# Patient Record
Sex: MALE | Race: WHITE | NOT HISPANIC OR LATINO | URBAN - METROPOLITAN AREA
[De-identification: names, ages, dates, MRNs, and addresses within clinical notes are randomized per-mention and may not be internally consistent; named-entity substitution may affect disease eponyms.]

---

## 2018-02-28 ENCOUNTER — INPATIENT (INPATIENT)
Facility: HOSPITAL | Age: 53
LOS: 2 days | Discharge: HOME | End: 2018-03-03
Attending: HOSPITALIST | Admitting: HOSPITALIST
Payer: COMMERCIAL

## 2018-02-28 VITALS — HEIGHT: 78 IN | WEIGHT: 235.01 LBS

## 2018-02-28 DIAGNOSIS — M96.621 FRACTURE OF HUMERUS FOLLOWING INSERTION OF ORTHOPEDIC IMPLANT, JOINT PROSTHESIS, OR BONE PLATE, RIGHT ARM: Chronic | ICD-10-CM

## 2018-02-28 LAB
ALBUMIN SERPL ELPH-MCNC: 3.8 G/DL — SIGNIFICANT CHANGE UP (ref 3.3–5)
ALBUMIN SERPL ELPH-MCNC: 3.9 G/DL — SIGNIFICANT CHANGE UP (ref 3.3–5)
ALP SERPL-CCNC: 74 U/L — SIGNIFICANT CHANGE UP (ref 40–120)
ALP SERPL-CCNC: 74 U/L — SIGNIFICANT CHANGE UP (ref 40–120)
ALT FLD-CCNC: 60 U/L — SIGNIFICANT CHANGE UP (ref 12–78)
ALT FLD-CCNC: 60 U/L — SIGNIFICANT CHANGE UP (ref 12–78)
ANION GAP SERPL CALC-SCNC: 11 MMOL/L — SIGNIFICANT CHANGE UP (ref 5–17)
ANION GAP SERPL CALC-SCNC: 9 MMOL/L — SIGNIFICANT CHANGE UP (ref 5–17)
APTT BLD: 28.3 SEC — SIGNIFICANT CHANGE UP (ref 27.5–37.4)
AST SERPL-CCNC: 34 U/L — SIGNIFICANT CHANGE UP (ref 15–37)
AST SERPL-CCNC: 38 U/L — HIGH (ref 15–37)
BASOPHILS # BLD AUTO: 0.1 K/UL — SIGNIFICANT CHANGE UP (ref 0–0.2)
BASOPHILS NFR BLD AUTO: 2.3 % — HIGH (ref 0–2)
BILIRUB DIRECT SERPL-MCNC: 0.1 MG/DL — SIGNIFICANT CHANGE UP (ref 0–0.2)
BILIRUB INDIRECT FLD-MCNC: 0.5 MG/DL — SIGNIFICANT CHANGE UP (ref 0.2–1)
BILIRUB SERPL-MCNC: 0.3 MG/DL — SIGNIFICANT CHANGE UP (ref 0.2–1.2)
BILIRUB SERPL-MCNC: 0.6 MG/DL — SIGNIFICANT CHANGE UP (ref 0.2–1.2)
BLD GP AB SCN SERPL QL: SIGNIFICANT CHANGE UP
BUN SERPL-MCNC: 12 MG/DL — SIGNIFICANT CHANGE UP (ref 7–23)
BUN SERPL-MCNC: 14 MG/DL — SIGNIFICANT CHANGE UP (ref 7–23)
CALCIUM SERPL-MCNC: 8.9 MG/DL — SIGNIFICANT CHANGE UP (ref 8.5–10.1)
CALCIUM SERPL-MCNC: 9 MG/DL — SIGNIFICANT CHANGE UP (ref 8.5–10.1)
CHLORIDE SERPL-SCNC: 100 MMOL/L — SIGNIFICANT CHANGE UP (ref 96–108)
CHLORIDE SERPL-SCNC: 109 MMOL/L — HIGH (ref 96–108)
CK SERPL-CCNC: 97 U/L — SIGNIFICANT CHANGE UP (ref 26–308)
CO2 SERPL-SCNC: 23 MMOL/L — SIGNIFICANT CHANGE UP (ref 22–31)
CO2 SERPL-SCNC: 27 MMOL/L — SIGNIFICANT CHANGE UP (ref 22–31)
CREAT SERPL-MCNC: 0.76 MG/DL — SIGNIFICANT CHANGE UP (ref 0.5–1.3)
CREAT SERPL-MCNC: 0.87 MG/DL — SIGNIFICANT CHANGE UP (ref 0.5–1.3)
EOSINOPHIL # BLD AUTO: 0.3 K/UL — SIGNIFICANT CHANGE UP (ref 0–0.5)
EOSINOPHIL NFR BLD AUTO: 6.3 % — HIGH (ref 0–6)
GLUCOSE SERPL-MCNC: 104 MG/DL — HIGH (ref 70–99)
GLUCOSE SERPL-MCNC: 97 MG/DL — SIGNIFICANT CHANGE UP (ref 70–99)
HCT VFR BLD CALC: 43.4 % — SIGNIFICANT CHANGE UP (ref 39–50)
HGB BLD-MCNC: 14.5 G/DL — SIGNIFICANT CHANGE UP (ref 13–17)
INR BLD: 0.96 RATIO — SIGNIFICANT CHANGE UP (ref 0.88–1.16)
LYMPHOCYTES # BLD AUTO: 1.1 K/UL — SIGNIFICANT CHANGE UP (ref 1–3.3)
LYMPHOCYTES # BLD AUTO: 25.2 % — SIGNIFICANT CHANGE UP (ref 13–44)
MAGNESIUM SERPL-MCNC: 2 MG/DL — SIGNIFICANT CHANGE UP (ref 1.6–2.6)
MCHC RBC-ENTMCNC: 29.6 PG — SIGNIFICANT CHANGE UP (ref 27–34)
MCHC RBC-ENTMCNC: 33.4 GM/DL — SIGNIFICANT CHANGE UP (ref 32–36)
MCV RBC AUTO: 88.6 FL — SIGNIFICANT CHANGE UP (ref 80–100)
MONOCYTES # BLD AUTO: 0.4 K/UL — SIGNIFICANT CHANGE UP (ref 0–0.9)
MONOCYTES NFR BLD AUTO: 9.9 % — SIGNIFICANT CHANGE UP (ref 2–14)
NEUTROPHILS # BLD AUTO: 2.4 K/UL — SIGNIFICANT CHANGE UP (ref 1.8–7.4)
NEUTROPHILS NFR BLD AUTO: 56.2 % — SIGNIFICANT CHANGE UP (ref 43–77)
PHOSPHATE SERPL-MCNC: 3.4 MG/DL — SIGNIFICANT CHANGE UP (ref 2.5–4.5)
PLATELET # BLD AUTO: 286 K/UL — SIGNIFICANT CHANGE UP (ref 150–400)
POTASSIUM SERPL-MCNC: 3.8 MMOL/L — SIGNIFICANT CHANGE UP (ref 3.5–5.3)
POTASSIUM SERPL-MCNC: 3.9 MMOL/L — SIGNIFICANT CHANGE UP (ref 3.5–5.3)
POTASSIUM SERPL-SCNC: 3.8 MMOL/L — SIGNIFICANT CHANGE UP (ref 3.5–5.3)
POTASSIUM SERPL-SCNC: 3.9 MMOL/L — SIGNIFICANT CHANGE UP (ref 3.5–5.3)
PROT SERPL-MCNC: 7.5 GM/DL — SIGNIFICANT CHANGE UP (ref 6–8.3)
PROT SERPL-MCNC: 7.6 GM/DL — SIGNIFICANT CHANGE UP (ref 6–8.3)
PROTHROM AB SERPL-ACNC: 10.4 SEC — SIGNIFICANT CHANGE UP (ref 9.8–12.7)
RBC # BLD: 4.9 M/UL — SIGNIFICANT CHANGE UP (ref 4.2–5.8)
RBC # FLD: 12.8 % — SIGNIFICANT CHANGE UP (ref 10.3–14.5)
SODIUM SERPL-SCNC: 136 MMOL/L — SIGNIFICANT CHANGE UP (ref 135–145)
SODIUM SERPL-SCNC: 143 MMOL/L — SIGNIFICANT CHANGE UP (ref 135–145)
TROPONIN I SERPL-MCNC: <0.015 NG/ML — SIGNIFICANT CHANGE UP (ref 0.01–0.04)
TROPONIN I SERPL-MCNC: <0.015 NG/ML — SIGNIFICANT CHANGE UP (ref 0.01–0.04)
TYPE + AB SCN PNL BLD: SIGNIFICANT CHANGE UP
WBC # BLD: 4.2 K/UL — SIGNIFICANT CHANGE UP (ref 3.8–10.5)
WBC # FLD AUTO: 4.2 K/UL — SIGNIFICANT CHANGE UP (ref 3.8–10.5)

## 2018-02-28 PROCEDURE — 71275 CT ANGIOGRAPHY CHEST: CPT | Mod: 26

## 2018-02-28 PROCEDURE — 74175 CTA ABDOMEN W/CONTRAST: CPT | Mod: 26

## 2018-02-28 PROCEDURE — 99285 EMERGENCY DEPT VISIT HI MDM: CPT

## 2018-02-28 PROCEDURE — 71045 X-RAY EXAM CHEST 1 VIEW: CPT | Mod: 26

## 2018-02-28 PROCEDURE — 93010 ELECTROCARDIOGRAM REPORT: CPT

## 2018-02-28 RX ORDER — ACETAMINOPHEN 500 MG
650 TABLET ORAL EVERY 6 HOURS
Qty: 0 | Refills: 0 | Status: DISCONTINUED | OUTPATIENT
Start: 2018-02-28 | End: 2018-03-03

## 2018-02-28 RX ORDER — ASPIRIN/CALCIUM CARB/MAGNESIUM 324 MG
81 TABLET ORAL DAILY
Qty: 0 | Refills: 0 | Status: DISCONTINUED | OUTPATIENT
Start: 2018-02-28 | End: 2018-03-03

## 2018-02-28 RX ORDER — FOLIC ACID 0.8 MG
1 TABLET ORAL DAILY
Qty: 0 | Refills: 0 | Status: DISCONTINUED | OUTPATIENT
Start: 2018-02-28 | End: 2018-03-03

## 2018-02-28 RX ORDER — LABETALOL HCL 100 MG
10 TABLET ORAL ONCE
Qty: 0 | Refills: 0 | Status: COMPLETED | OUTPATIENT
Start: 2018-02-28 | End: 2018-02-28

## 2018-02-28 RX ORDER — THIAMINE MONONITRATE (VIT B1) 100 MG
100 TABLET ORAL DAILY
Qty: 0 | Refills: 0 | Status: COMPLETED | OUTPATIENT
Start: 2018-02-28 | End: 2018-03-03

## 2018-02-28 RX ORDER — INFLUENZA VIRUS VACCINE 15; 15; 15; 15 UG/.5ML; UG/.5ML; UG/.5ML; UG/.5ML
0.5 SUSPENSION INTRAMUSCULAR ONCE
Qty: 0 | Refills: 0 | Status: COMPLETED | OUTPATIENT
Start: 2018-02-28 | End: 2018-02-28

## 2018-02-28 RX ORDER — ENOXAPARIN SODIUM 100 MG/ML
40 INJECTION SUBCUTANEOUS DAILY
Qty: 0 | Refills: 0 | Status: DISCONTINUED | OUTPATIENT
Start: 2018-02-28 | End: 2018-02-28

## 2018-02-28 RX ORDER — METOPROLOL TARTRATE 50 MG
50 TABLET ORAL ONCE
Qty: 0 | Refills: 0 | Status: COMPLETED | OUTPATIENT
Start: 2018-02-28 | End: 2018-02-28

## 2018-02-28 RX ORDER — ASPIRIN/CALCIUM CARB/MAGNESIUM 324 MG
325 TABLET ORAL ONCE
Qty: 0 | Refills: 0 | Status: COMPLETED | OUTPATIENT
Start: 2018-02-28 | End: 2018-02-28

## 2018-02-28 RX ORDER — ATORVASTATIN CALCIUM 80 MG/1
40 TABLET, FILM COATED ORAL AT BEDTIME
Qty: 0 | Refills: 0 | Status: DISCONTINUED | OUTPATIENT
Start: 2018-02-28 | End: 2018-03-03

## 2018-02-28 RX ORDER — NITROGLYCERIN 6.5 MG
30 CAPSULE, EXTENDED RELEASE ORAL
Qty: 50 | Refills: 0 | Status: DISCONTINUED | OUTPATIENT
Start: 2018-02-28 | End: 2018-03-03

## 2018-02-28 RX ORDER — HYDRALAZINE HCL 50 MG
10 TABLET ORAL ONCE
Qty: 0 | Refills: 0 | Status: COMPLETED | OUTPATIENT
Start: 2018-02-28 | End: 2018-02-28

## 2018-02-28 RX ADMIN — Medication 650 MILLIGRAM(S): at 16:55

## 2018-02-28 RX ADMIN — Medication 1 MILLIGRAM(S): at 16:46

## 2018-02-28 RX ADMIN — Medication 325 MILLIGRAM(S): at 11:11

## 2018-02-28 RX ADMIN — Medication 1 TABLET(S): at 16:46

## 2018-02-28 RX ADMIN — Medication 100 MILLIGRAM(S): at 16:46

## 2018-02-28 RX ADMIN — ATORVASTATIN CALCIUM 40 MILLIGRAM(S): 80 TABLET, FILM COATED ORAL at 21:24

## 2018-02-28 RX ADMIN — Medication 10 MILLIGRAM(S): at 11:11

## 2018-02-28 RX ADMIN — Medication 10 MILLIGRAM(S): at 14:36

## 2018-02-28 RX ADMIN — Medication 9 MICROGRAM(S)/MIN: at 16:12

## 2018-02-28 RX ADMIN — Medication 50 MILLIGRAM(S): at 12:47

## 2018-02-28 RX ADMIN — Medication 650 MILLIGRAM(S): at 16:46

## 2018-02-28 RX ADMIN — Medication 10 MILLIGRAM(S): at 11:49

## 2018-02-28 NOTE — ED PROVIDER NOTE - PROGRESS NOTE DETAILS
CT scan negative for dissection; (+) aorta aneurysm; hypertensive urgency, emergency- labetalol given; will admit.

## 2018-02-28 NOTE — H&P ADULT - NSHPLABSRESULTS_GEN_ALL_CORE
14.5   4.2   )-----------( 286      ( 28 Feb 2018 09:34 )             43.4   02-28    143  |  109<H>  |  14  ----------------------------<  104<H>  3.9   |  23  |  0.87    Ca    8.9      28 Feb 2018 09:34    TPro  7.5  /  Alb  3.8  /  TBili  0.3  /  DBili  x   /  AST  38<H>  /  ALT  60  /  AlkPhos  74  02-28    PT/INR - ( 28 Feb 2018 09:34 )   PT: 10.4 sec;   INR: 0.96 ratio         PTT - ( 28 Feb 2018 09:34 )  PTT:28.3 sec    EXAM:  CT ANGIO ABDOMEN ONLY IC                          EXAM:  CTA CHEST DISSECTION                            PROCEDURE DATE:  02/28/2018          INTERPRETATION:  Clinical information: 51-year-old man with chest pain   radiating to the left arm    COMPARISON: None    PROCEDURE:   CT of the Chest, Abdomen and Pelvis was performed with and without   intravenous contrast.   Precontrast imaging was performed through the chest followed by arterial   phase imaging of the chest, abdomen and pelvis in the arterial phase.  Intravenous contrast: 90 ml Omnipaque 350. 10 ml discarded.  Oral contrast:None.  Sagittal and coronal reformats were performed and MIP images were   obtained.    FINDINGS:    CHEST:     LOWER NECK: Calcification in the thyroid isthmus.  AXILLA, MEDIASTINUM AND KYLIE: No lymphadenopathy.  VESSELS: Mildly dilated ascending aorta measuring 4.4 cm at the level of   the main pulmonary artery. No dissection, intramural hematoma or   penetrating ulcer.  HEART: Heart size is normal.No pericardial effusion.  PLEURA: No pleural effusion.  LUNGS AND LARGE AIRWAYS: Patent central airways. No nodule, mass or   consolidation.  CHEST WALL:  Unremarkable    ABDOMEN AND PELVIS:    LIVER: Within normal limits.  SPLEEN: Within normal limits.  PANCREAS: Within normal limits.  GALLBLADDER: Within normal limits.  BILE DUCTS: Normal caliber.  ADRENALS: Within normal limits.  KIDNEYS/URETERS: No mass, stone or hydronephrosis.    RETROPERITONEUM: No lymphadenopathy.    VESSELS:  Within normal limits.      BOWEL: No bowel obstruction, wall thickening or inflammatory change.   Appendix normal. Colonic diverticulosis without diverticulitis.  PERITONEUM: No ascites or pneumoperitoneum.    REPRODUCTIVE ORGANS: Prostate seminal vesicles are unremarkable.  BLADDER: Within normal limits    ABDOMINAL WALL: Within normal limits.  BONES: No acute abnormality.    IMPRESSION: Aneurysmal ascending aorta measuring 4.4 cm. No dissection,   penetrating ulcer or intramural hemorrhage. No acute chest or abdominal   pathology.                  MAGALI WILDER   This document has been electronically signed. Feb 28 2018 10:48AM 14.5   4.2   )-----------( 286      ( 28 Feb 2018 09:34 )             43.4   02-28    143  |  109<H>  |  14  ----------------------------<  104<H>  3.9   |  23  |  0.87    Ca    8.9      28 Feb 2018 09:34    TPro  7.5  /  Alb  3.8  /  TBili  0.3  /  DBili  x   /  AST  38<H>  /  ALT  60  /  AlkPhos  74  02-28    PT/INR - ( 28 Feb 2018 09:34 )   PT: 10.4 sec;   INR: 0.96 ratio         PTT - ( 28 Feb 2018 09:34 )  PTT:28.3 sec    EXAM:  CT ANGIO ABDOMEN ONLY IC                          EXAM:  CTA CHEST DISSECTION                            PROCEDURE DATE:  02/28/2018          INTERPRETATION:  Clinical information: 51-year-old man with chest pain   radiating to the left arm    COMPARISON: None    PROCEDURE:   CT of the Chest, Abdomen and Pelvis was performed with and without   intravenous contrast.   Precontrast imaging was performed through the chest followed by arterial   phase imaging of the chest, abdomen and pelvis in the arterial phase.  Intravenous contrast: 90 ml Omnipaque 350. 10 ml discarded.  Oral contrast:None.  Sagittal and coronal reformats were performed and MIP images were   obtained.    FINDINGS:    CHEST:     LOWER NECK: Calcification in the thyroid isthmus.  AXILLA, MEDIASTINUM AND KYLIE: No lymphadenopathy.  VESSELS: Mildly dilated ascending aorta measuring 4.4 cm at the level of   the main pulmonary artery. No dissection, intramural hematoma or   penetrating ulcer.  HEART: Heart size is normal.No pericardial effusion.  PLEURA: No pleural effusion.  LUNGS AND LARGE AIRWAYS: Patent central airways. No nodule, mass or   consolidation.  CHEST WALL:  Unremarkable    ABDOMEN AND PELVIS:    LIVER: Within normal limits.  SPLEEN: Within normal limits.  PANCREAS: Within normal limits.  GALLBLADDER: Within normal limits.  BILE DUCTS: Normal caliber.  ADRENALS: Within normal limits.  KIDNEYS/URETERS: No mass, stone or hydronephrosis.    RETROPERITONEUM: No lymphadenopathy.    VESSELS:  Within normal limits.      BOWEL: No bowel obstruction, wall thickening or inflammatory change.   Appendix normal. Colonic diverticulosis without diverticulitis.  PERITONEUM: No ascites or pneumoperitoneum.    REPRODUCTIVE ORGANS: Prostate seminal vesicles are unremarkable.  BLADDER: Within normal limits    ABDOMINAL WALL: Within normal limits.  BONES: No acute abnormality.    IMPRESSION: Aneurysmal ascending aorta measuring 4.4 cm. No dissection,   penetrating ulcer or intramural hemorrhage. No acute chest or abdominal   pathology.              EKG: GORDON WILDER   This document has been electronically signed. Feb 28 2018 10:48AM

## 2018-02-28 NOTE — ED ADULT NURSE REASSESSMENT NOTE - NS ED NURSE REASSESS COMMENT FT1
pt c/o headache and blurred vision. Dr flores called and made aware, as per MD lower tridal infusion from 30 to 10, pt medicated with tlyneol. safey maintained will continue to monitor Pt bp from 220/155 to 144/101 Dr flores aware

## 2018-02-28 NOTE — ED PROVIDER NOTE - OBJECTIVE STATEMENT
53 y/o male with no relevant PMHx presents to the ED c/o substernal CP radiating to the left arm today. 5 days ago pt thought he pulled a muscle in his back and yesterday he "didn't feel right". This morning he woke up with chest tightness that radiated to his left arm with entire left arm numbness. Pt reports SOB. Currently, pt reports chest tightness, left arm numbness. No weakness. No daily medications. Last PMD visit was 5 years ago. Pt notes that he has a family cardiac history, but is unsure of what specifically. Never-smoker. Pt drinks 3-4 beers every day. No illicit substance use.

## 2018-02-28 NOTE — ED ADULT NURSE NOTE - OBJECTIVE STATEMENT
Pt presents to ED from home, ambulatory, pt alert and orientedx4, HTN noted, pt c/o chest tightness, sob, back pain, left arm numbness since yesteday. pt denies hx of MI or CVA. safety maintained will continue to monitor

## 2018-02-28 NOTE — H&P ADULT - ATTENDING COMMENTS
Pt. seen and examined with NETTE Roche. Agree with assessment and plan as above. Changes made where appropriate.

## 2018-02-28 NOTE — SBIRT NOTE. - NSSBIRTSERVICES_GEN_A_ED_FT
Provided SBIRT services: Full screen positive. Brief Intervention Performed. Screening results were reviewed with the patient and patient was provided information about healthy guidelines and potential negative consequences associated with level of risk. Motivation and readiness to reduce or stop use was discussed and goals and activities to make changes were suggested/offered.  Audit Score: 13  DAST Score: 0

## 2018-02-28 NOTE — H&P ADULT - HISTORY OF PRESENT ILLNESS
51 y/o male with no past medical history who presents to the ED with chest pain radiating down left arm and feels the pain shooting through his back. Pt. states he has had the pain since yesterday afternoon and he thought it would subside but this morning his left arm was numb and he drove himself to the ED. He has not seen a doctor in over 5 years and takes no medications. As per pt, he has a blood pressure cuff at home and when he took his BP over the years it would be in the 180s. 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 Of note, he drinks 4-5 beers every day for past 25 years.    ED course: s/p IV Labetalol 10mg x 2, IV Hydralazine BP persists 190s/130s.     CT angio: IMPRESSION: Aneurysmal ascending aorta measuring 4.4 cm. No dissection, penetrating ulcer or intramural hemorrhage. No acute chest or abdominal pathology.    Xray: Impression: The heart is normal in size. The lungs are clear. Multiple rib fractures   are seen on the left. Age indeterminant. No pneumothorax. 51 y/o male with no past medical history who presents to the ED with chest pain radiating down left arm and feels the pain shooting through his back. Pt. states he has had the pain 1 day PTA and he thought it would subside but morning of arrival to ED his left arm was numb and he drove himself to the ED. He has not seen a doctor in over 5 years and takes no medications. As per pt, he has a blood pressure cuff at home and when he took his BP over the years it would be in the 180s. He is a lenz and states he walks about 34361 steps a day and has good exercise tolerance but has noticed over the past year that he gets winded and tired quicker. Of note, he drinks 4-5 beers every day for past 25 years. Last drink 2/27.   No hx of w/d seizures.     ED course: s/p IV Labetalol 10mg x 2, IV Hydralazine BP persists 190s/130s.     CT angio: IMPRESSION: Aneurysmal ascending aorta measuring 4.4 cm. No dissection, penetrating ulcer or intramural hemorrhage. No acute chest or abdominal pathology.    Xray: Impression: The heart is normal in size. The lungs are clear. Multiple rib fractures   are seen on the left. Age indeterminant. No pneumothorax.      social: non smoker, drinks 4-5 beers daily for 25 yrs, no ellicit drug use  Shx: none  Fhx: mother HTN, father-> unknown, siblings alive and healthy, significant cardiac history on maternal aunts/uncles ie early CAD

## 2018-02-28 NOTE — H&P ADULT - NSHPPHYSICALEXAM_GEN_ALL_CORE
PHYSICAL EXAM:    GEN: lying in bed, anxious  HEENT:   NC/AT  CV:  +S1, +S2, RRR  RESP:  CTA B/L  GI:  abdomen soft, non-tender, non-distended, normoactive BS  MSK:   normal muscle tone  EXT:  no edema  NEURO:  AAOX3, no focal neurological deficits  SKIN:  no rashes PHYSICAL EXAM:    GEN: lying in bed, anxious  HEENT:   NC/AT, no carotid bruits  CV:  +S1, +S2, RRR  RESP:  CTA B/L  GI:  abdomen soft, non-tender, non-distended, normoactive BS  MSK:   normal muscle tone  EXT:  no edema  NEURO:  AAOX3, no focal neurological deficits  SKIN:  no rashes

## 2018-02-28 NOTE — H&P ADULT - ASSESSMENT
51 y/o M with no PMHx admitted with:    # chest pain/HTN emergency/rule out ACS  - BP uncontrolled despite IV Labetalol and IV Hydralazine  - admit to CICU   - start nitro gtt  - cardio consult - Dr. Hendricks  - f/u 2D echo    # triple A  - IMPRESSION: Aneurysmal ascending aorta measuring 4.4 cm. No dissection,   penetrating ulcer or intramural hemorrhage. No acute chest or abdominal   pathology.  - cardio thoracic consult  - no previous radiology to compare to    # ETOH use  - [facility] protocol   - MVI, thiamine, folic acid  - discussed etoh withdrawal symptoms, need for cessation    # rib fx  - CXR: Multiple rib fractures are seen on the left  - pain control  - PT when BP stable    # DVT ppx  - Lovenox SQ 53 y/o M with no PMHx admitted with:    # HTN emergency/Angina  - BP uncontrolled despite IV Labetalol and IV Hydralazine  - admit to CICU   - start nitro gtt. ASA, statin. When BP controlled start BB/ACE-I  - cardio consult - Dr. Hendricks  - f/u 2D echo    # AAA  - IMPRESSION: Aneurysmal ascending aorta measuring 4.4 cm. No dissection,   penetrating ulcer or intramural hemorrhage. No acute chest or abdominal   pathology.  - cardio thoracic consult  - no previous radiology to compare to    # ETOH use  - [facility] protocol   - MVI, thiamine, folic acid  - discussed etoh withdrawal symptoms, need for cessation    # rib fx  - CXR: Multiple rib fractures are seen on the left-age indeterminate fractures  - pain control  - PT when BP stable    # DVT ppx  - with ongoing chest pain and AAA, will hold LMWH/UFH and control BP better. SCDs for now.

## 2018-02-28 NOTE — H&P ADULT - FAMILY HISTORY
Mother  Still living? Unknown  Family history of heart disease, Age at diagnosis: Age Unknown     Uncle  Still living? Yes, Estimated age: Age Unknown  Family history of heart disease, Age at diagnosis: Age Unknown

## 2018-03-01 DIAGNOSIS — I16.0 HYPERTENSIVE URGENCY: ICD-10-CM

## 2018-03-01 DIAGNOSIS — R07.2 PRECORDIAL PAIN: ICD-10-CM

## 2018-03-01 LAB
ANION GAP SERPL CALC-SCNC: 7 MMOL/L — SIGNIFICANT CHANGE UP (ref 5–17)
BUN SERPL-MCNC: 12 MG/DL — SIGNIFICANT CHANGE UP (ref 7–23)
CALCIUM SERPL-MCNC: 8.7 MG/DL — SIGNIFICANT CHANGE UP (ref 8.5–10.1)
CHLORIDE SERPL-SCNC: 105 MMOL/L — SIGNIFICANT CHANGE UP (ref 96–108)
CHOLEST SERPL-MCNC: 225 MG/DL — HIGH (ref 10–199)
CO2 SERPL-SCNC: 27 MMOL/L — SIGNIFICANT CHANGE UP (ref 22–31)
CREAT SERPL-MCNC: 0.8 MG/DL — SIGNIFICANT CHANGE UP (ref 0.5–1.3)
GLUCOSE SERPL-MCNC: 103 MG/DL — HIGH (ref 70–99)
HBA1C BLD-MCNC: 5.6 % — SIGNIFICANT CHANGE UP (ref 4–5.6)
HDLC SERPL-MCNC: 83 MG/DL — SIGNIFICANT CHANGE UP (ref 40–125)
LIPID PNL WITH DIRECT LDL SERPL: 125 MG/DL — SIGNIFICANT CHANGE UP
MAGNESIUM SERPL-MCNC: 2.5 MG/DL — SIGNIFICANT CHANGE UP (ref 1.6–2.6)
PHOSPHATE SERPL-MCNC: 3.3 MG/DL — SIGNIFICANT CHANGE UP (ref 2.5–4.5)
POTASSIUM SERPL-MCNC: 4.1 MMOL/L — SIGNIFICANT CHANGE UP (ref 3.5–5.3)
POTASSIUM SERPL-SCNC: 4.1 MMOL/L — SIGNIFICANT CHANGE UP (ref 3.5–5.3)
SODIUM SERPL-SCNC: 139 MMOL/L — SIGNIFICANT CHANGE UP (ref 135–145)
TOTAL CHOLESTEROL/HDL RATIO MEASUREMENT: 2.7 RATIO — LOW (ref 3.4–9.6)
TRIGL SERPL-MCNC: 86 MG/DL — SIGNIFICANT CHANGE UP (ref 10–149)

## 2018-03-01 PROCEDURE — 99223 1ST HOSP IP/OBS HIGH 75: CPT

## 2018-03-01 PROCEDURE — 93306 TTE W/DOPPLER COMPLETE: CPT | Mod: 26

## 2018-03-01 PROCEDURE — 93010 ELECTROCARDIOGRAM REPORT: CPT

## 2018-03-01 RX ORDER — ALPRAZOLAM 0.25 MG
0.25 TABLET ORAL EVERY 8 HOURS
Qty: 0 | Refills: 0 | Status: DISCONTINUED | OUTPATIENT
Start: 2018-03-01 | End: 2018-03-03

## 2018-03-01 RX ORDER — HYDRALAZINE HCL 50 MG
10 TABLET ORAL EVERY 8 HOURS
Qty: 0 | Refills: 0 | Status: DISCONTINUED | OUTPATIENT
Start: 2018-03-01 | End: 2018-03-03

## 2018-03-01 RX ORDER — HYDRALAZINE HCL 50 MG
10 TABLET ORAL ONCE
Qty: 0 | Refills: 0 | Status: COMPLETED | OUTPATIENT
Start: 2018-03-01 | End: 2018-03-01

## 2018-03-01 RX ORDER — METOPROLOL TARTRATE 50 MG
5 TABLET ORAL EVERY 6 HOURS
Qty: 0 | Refills: 0 | Status: DISCONTINUED | OUTPATIENT
Start: 2018-03-01 | End: 2018-03-02

## 2018-03-01 RX ORDER — HYDRALAZINE HCL 50 MG
25 TABLET ORAL EVERY 8 HOURS
Qty: 0 | Refills: 0 | Status: DISCONTINUED | OUTPATIENT
Start: 2018-03-01 | End: 2018-03-03

## 2018-03-01 RX ORDER — METOPROLOL TARTRATE 50 MG
5 TABLET ORAL ONCE
Qty: 0 | Refills: 0 | Status: COMPLETED | OUTPATIENT
Start: 2018-03-01 | End: 2018-03-01

## 2018-03-01 RX ORDER — HYDRALAZINE HCL 50 MG
25 TABLET ORAL THREE TIMES A DAY
Qty: 0 | Refills: 0 | Status: DISCONTINUED | OUTPATIENT
Start: 2018-03-01 | End: 2018-03-01

## 2018-03-01 RX ADMIN — Medication 25 MILLIGRAM(S): at 21:57

## 2018-03-01 RX ADMIN — Medication 25 MILLIGRAM(S): at 14:10

## 2018-03-01 RX ADMIN — Medication 0.25 MILLIGRAM(S): at 21:57

## 2018-03-01 RX ADMIN — Medication 5 MILLIGRAM(S): at 17:11

## 2018-03-01 RX ADMIN — Medication 81 MILLIGRAM(S): at 11:36

## 2018-03-01 RX ADMIN — Medication 5 MILLIGRAM(S): at 10:21

## 2018-03-01 RX ADMIN — Medication 1 MILLIGRAM(S): at 11:36

## 2018-03-01 RX ADMIN — Medication 100 MILLIGRAM(S): at 11:36

## 2018-03-01 RX ADMIN — Medication 2 MILLIGRAM(S): at 11:05

## 2018-03-01 RX ADMIN — Medication 10 MILLIGRAM(S): at 10:43

## 2018-03-01 RX ADMIN — Medication 1 TABLET(S): at 11:36

## 2018-03-01 RX ADMIN — ATORVASTATIN CALCIUM 40 MILLIGRAM(S): 80 TABLET, FILM COATED ORAL at 21:57

## 2018-03-01 NOTE — PROGRESS NOTE ADULT - SUBJECTIVE AND OBJECTIVE BOX
CHIEF COMPLAINT: chest pain    SUBJECTIVE: no EtOH wd however somewhat anxious.  BP meds changed this AM per cardio with good result thus far.  Back pain better still reports left arm pain.    REVIEW OF SYSTEMS: All other review of systems is negative unless indicated above    Vital Signs Last 24 Hrs  T(C): 36.2 (01 Mar 2018 16:16), Max: 36.2 (01 Mar 2018 16:16)  T(F): 97.2 (01 Mar 2018 16:16), Max: 97.2 (01 Mar 2018 16:16)  HR: 99 (01 Mar 2018 14:30) (68 - 107)  BP: 140/74 (01 Mar 2018 14:30) (109/56 - 210/137)  BP(mean): 91 (01 Mar 2018 14:30) (70 - 151)  RR: 13 (28 Feb 2018 19:00) (13 - 16)  SpO2: --    PHYSICAL EXAM:  	GEN: lying in bed, anxious  	HEENT:   NC/AT, no carotid bruits  	CV:  +S1, +S2, RRR  	RESP:  CTA B/L  	GI:  abdomen soft, non-tender, non-distended, normoactive BS  	MSK:   normal muscle tone  	EXT:  no edema  	NEURO:  AAOX3, no focal neurological deficits    SKIN:  no rashes    MEDICATIONS:  MEDICATIONS  (STANDING):  ALPRAZolam 0.25 milliGRAM(s) Oral every 8 hours  aspirin  chewable 81 milliGRAM(s) Oral daily  atorvastatin 40 milliGRAM(s) Oral at bedtime  folic acid 1 milliGRAM(s) Oral daily  hydrALAZINE 25 milliGRAM(s) Oral every 8 hours  metoprolol    tartrate Injectable 5 milliGRAM(s) IV Push every 6 hours  multivitamin 1 Tablet(s) Oral daily  nitroglycerin  Infusion 30 MICROgram(s)/Min (9 mL/Hr) IV Continuous <Continuous>  thiamine 100 milliGRAM(s) Oral daily    LABS: All Labs Reviewed:                        14.5   4.2   )-----------( 286      ( 28 Feb 2018 09:34 )             43.4     139  |  105  |  12  ----------------------------<  103<H>  4.1   |  27  |  0.80    Ca    8.7      01 Mar 2018 06:40  Phos  3.3     03-01  Mg     2.5     03-01    TPro  7.6  /  Alb  3.9  /  TBili  0.6  /  DBili  0.1  /  AST  34  /  ALT  60  /  AlkPhos  74  02-28    PT/INR - ( 28 Feb 2018 09:34 )   PT: 10.4 sec;   INR: 0.96 ratio    PTT - ( 28 Feb 2018 09:34 )  PTT:28.3 sec  CARDIAC MARKERS ( 28 Feb 2018 15:45 )  <0.015 ng/mL / x     / 97 U/L / x     / x      CARDIAC MARKERS ( 28 Feb 2018 09:34 )  <0.015 ng/mL / x     / x     / x     / x
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
## 2018-03-02 PROCEDURE — 99233 SBSQ HOSP IP/OBS HIGH 50: CPT

## 2018-03-02 RX ORDER — METOPROLOL TARTRATE 50 MG
25 TABLET ORAL
Qty: 0 | Refills: 0 | Status: DISCONTINUED | OUTPATIENT
Start: 2018-03-02 | End: 2018-03-03

## 2018-03-02 RX ORDER — METOPROLOL TARTRATE 50 MG
25 TABLET ORAL DAILY
Qty: 0 | Refills: 0 | Status: DISCONTINUED | OUTPATIENT
Start: 2018-03-02 | End: 2018-03-02

## 2018-03-02 RX ADMIN — Medication 25 MILLIGRAM(S): at 12:17

## 2018-03-02 RX ADMIN — Medication 1 TABLET(S): at 12:17

## 2018-03-02 RX ADMIN — Medication 100 MILLIGRAM(S): at 12:17

## 2018-03-02 RX ADMIN — Medication 25 MILLIGRAM(S): at 06:54

## 2018-03-02 RX ADMIN — Medication 0.25 MILLIGRAM(S): at 14:22

## 2018-03-02 RX ADMIN — Medication 81 MILLIGRAM(S): at 12:17

## 2018-03-02 RX ADMIN — Medication 25 MILLIGRAM(S): at 22:14

## 2018-03-02 RX ADMIN — Medication 0.25 MILLIGRAM(S): at 06:53

## 2018-03-02 RX ADMIN — Medication 10 MILLIGRAM(S): at 17:06

## 2018-03-02 RX ADMIN — Medication 650 MILLIGRAM(S): at 22:19

## 2018-03-02 RX ADMIN — ATORVASTATIN CALCIUM 40 MILLIGRAM(S): 80 TABLET, FILM COATED ORAL at 22:16

## 2018-03-02 RX ADMIN — Medication 5 MILLIGRAM(S): at 06:53

## 2018-03-02 RX ADMIN — Medication 1 MILLIGRAM(S): at 12:17

## 2018-03-02 RX ADMIN — Medication 25 MILLIGRAM(S): at 14:22

## 2018-03-02 RX ADMIN — Medication 5 MILLIGRAM(S): at 00:37

## 2018-03-02 RX ADMIN — Medication 0.25 MILLIGRAM(S): at 22:14
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
## 2018-03-02 NOTE — PROGRESS NOTE ADULT - SUBJECTIVE AND OBJECTIVE BOX
CHIEF COMPLAINT: chest pain    SUBJECTIVE: no EtOH wd.  Left arm pain improves when bp better. BP has been variably high and low.      REVIEW OF SYSTEMS: All other review of systems is negative unless indicated above    Vital Signs Last 24 Hrs  T(C): 36.6 (02 Mar 2018 05:47), Max: 36.6 (01 Mar 2018 19:32)  T(F): 97.9 (02 Mar 2018 05:47), Max: 97.9 (01 Mar 2018 19:32)  HR: 83 (02 Mar 2018 14:00) (70 - 100)  BP: 162/108 (02 Mar 2018 14:00) (106/75 - 208/124)  BP(mean): 119 (02 Mar 2018 14:00) (82 - 149)  RR: --  SpO2: --    PHYSICAL EXAM:  	GEN: lying in bed, anxious  	HEENT:   NC/AT, no carotid bruits  	CV:  +S1, +S2, RRR  	RESP:  CTA B/L  	GI:  abdomen soft, non-tender, non-distended, normoactive BS  	MSK:   normal muscle tone  	EXT:  no edema  	NEURO:  AAOX3, no focal neurological deficits              SKIN:  no rashes    MEDICATIONS:  MEDICATIONS  (STANDING):  ALPRAZolam 0.25 milliGRAM(s) Oral every 8 hours  aspirin  chewable 81 milliGRAM(s) Oral daily  atorvastatin 40 milliGRAM(s) Oral at bedtime  folic acid 1 milliGRAM(s) Oral daily  hydrALAZINE 25 milliGRAM(s) Oral every 8 hours  metoprolol succinate ER 25 milliGRAM(s) Oral daily  multivitamin 1 Tablet(s) Oral daily  nitroglycerin  Infusion 30 MICROgram(s)/Min (9 mL/Hr) IV Continuous <Continuous>  thiamine 100 milliGRAM(s) Oral daily    LABS: All Labs Reviewed:    139  |  105  |  12  ----------------------------<  103<H>  4.1   |  27  |  0.80    Ca    8.7      01 Mar 2018 06:40  Phos  3.3     03-01  Mg     2.5     03-01    < from: Transthoracic Echocardiogram (03.01.18 @ 11:28) >   Mild septal left ventricular hypertrophy is present. Left ventricular   wall   motion is normal. Estimated left ventricular ejection fraction is 60 %.   The aortic valve leaflets are well seen with normal valve morphology;   preserved leaflet excursion noted. No aortic valve insufficiency noted.   There is thickening of both mitral valve leaflets. The leaflet opening is   normal. No mitral regurgitation is present.

## 2018-03-02 NOTE — PROGRESS NOTE ADULT - SUBJECTIVE AND OBJECTIVE BOX
Subjective:  51 y/o male with no past medical history (does not see primary care but takes BP at home and gets readings of SBP of 180)  presented to the ED 2/28 with chest pain radiating down left arm and feels the pain shooting through his back. Pt. states he has had the pain 1 day PTA and he thought it would subside but morning of arrival to ED his left arm was numb and he drove himself to the ED. Pt w malignant HTN, SBP up to 200s. Pt had dissection protocol CT which showed an Aneurysmal ascending aorta measuring 4.4 cm. No dissection, penetrating ulcer or intramural hemorrhage. No acute chest or abdominal   pathology. Pt states he continues to have pain intermittently, but it is much better.  Pt BP better controlled on po/IV antihypertensives. OVernight with some elevated readings of SBP to 200s.    Vital Signs:  Vital Signs Last 24 Hrs  T(C): 36.6 (03-02-18 @ 05:47), Max: 36.6 (03-01-18 @ 19:32)  T(F): 97.9 (03-02-18 @ 05:47), Max: 97.9 (03-01-18 @ 19:32)  HR: 92 (03-02-18 @ 09:00) (70 - 107)  BP: 106/75 (03-02-18 @ 08:30) (106/75 - 210/137)  RR: --  SpO2: -- on (O2)    Telemetry/Alarms:    Relevant labs, radiology and Medications reviewed                        14.5   4.2   )-----------( 286      ( 28 Feb 2018 09:34 )             43.4     03-01    139  |  105  |  12  ----------------------------<  103<H>  4.1   |  27  |  0.80    Ca    8.7      01 Mar 2018 06:40  Phos  3.3     03-01  Mg     2.5     03-01    TPro  7.6  /  Alb  3.9  /  TBili  0.6  /  DBili  0.1  /  AST  34  /  ALT  60  /  AlkPhos  74  02-28    PT/INR - ( 28 Feb 2018 09:34 )   PT: 10.4 sec;   INR: 0.96 ratio         PTT - ( 28 Feb 2018 09:34 )  PTT:28.3 sec  MEDICATIONS  (STANDING):  ALPRAZolam 0.25 milliGRAM(s) Oral every 8 hours  aspirin  chewable 81 milliGRAM(s) Oral daily  atorvastatin 40 milliGRAM(s) Oral at bedtime  folic acid 1 milliGRAM(s) Oral daily  hydrALAZINE 25 milliGRAM(s) Oral every 8 hours  metoprolol succinate ER 25 milliGRAM(s) Oral daily  multivitamin 1 Tablet(s) Oral daily  nitroglycerin  Infusion 30 MICROgram(s)/Min (9 mL/Hr) IV Continuous <Continuous>  thiamine 100 milliGRAM(s) Oral daily    MEDICATIONS  (PRN):  acetaminophen   Tablet. 650 milliGRAM(s) Oral every 6 hours PRN Mild Pain (1 - 3)  hydrALAZINE Injectable 10 milliGRAM(s) IV Push every 8 hours PRN if sbp >190      Physical exam  Gen NAD  NeuroAAOx3  Card RRR  Pulm clear  Abd soft NT  Ext warm      I&O's Summary    01 Mar 2018 07:01  -  02 Mar 2018 07:00  --------------------------------------------------------  IN: 0 mL / OUT: 1 mL / NET: -1 mL        Assessment  52y Male  4.4 ascending aorta aneurysm    PLAN  f/u in 6 months w CT chest, Dr. Olson  strict BP control as per medicine/cardiology    Discussed with Cardiothoracic Team at AM rounds.

## 2018-03-02 NOTE — CDI QUERY NOTE - NSCDIOTHERTXTBX_GEN_ALL_CORE_HH
Documentation on chart of ETOH abuse. Pt. on Thiamine 100 mgs po daily.  Please document a clinical diagnosis with the above.  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

## 2018-03-02 NOTE — PROGRESS NOTE ADULT - PROBLEM SELECTOR PLAN 1
Resolved. Shoulder pain may represent elevation of blood pressure. Ischemia workup discussed and can follow up as opt.

## 2018-03-02 NOTE — PROGRESS NOTE ADULT - ASSESSMENT
51 y/o male with no past medical history who presents to the ED with chest pain radiating down left arm and feels the pain shooting through his back. Pt. states he has had the pain 1 day PTA and he thought it would subside but morning of arrival to ED his left arm was numb and he drove himself to the ED. He has not seen a doctor in over 5 years and takes no medications. As per pt, he has a blood pressure cuff at home and when he took his BP over the years it would be in the 180s. 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 Of note, he drinks 4-5 beers every day for past 25 years. Last drink 2/27.   No hx of w/d seizures.     ED course: s/p IV Labetalol 10mg x 2, IV Hydralazine BP persists 190s/130s.     CT angio: IMPRESSION: Aneurysmal ascending aorta measuring 4.4 cm. No dissection, penetrating ulcer or intramural hemorrhage. No acute chest or abdominal pathology.    Xray: Impression: The heart is normal in size. The lungs are clear. Multiple rib fractures   are seen on the left. Age indeterminant. No pneumothorax.    # HTN emergency/Angina  - meds being adjusted   - admit to CICU   - cardio consult - Dr. Hendricks appreciated  - f/u 2D echo    # AAA  - IMPRESSION: Aneurysmal ascending aorta measuring 4.4 cm. No dissection,   penetrating ulcer or intramural hemorrhage. No acute chest or abdominal   pathology.  - cardio thoracic consult appreicated. rpt study 6 months  - no previous radiology to compare to    # Left arm pain  - Cspine imaging? Unlikely able to be MRI d/t hardware from prior surgeries.  - Will re-eval if pain not improving     # ETOH use  # Anxiety  - dc ciwa  - MVI, thiamine, folic acid  - discussed etoh withdrawal symptoms, need for cessation  - low dose xanax q8h    # rib fx  - CXR: Multiple rib fractures are seen on the left-age indeterminate fractures  - pain control  - PT when BP stable    # DVT ppx  - with ongoing chest pain and AAA, will hold LMWH/UFH and control BP better. SCDs for now.
A/P- 53 y/o male with no known past medical history (does not see primary care but takes BP at home and gets readings of SBP of 180) admitted 2/28 w with chest pain radiating down left arm and feels the pain shooting through his back, w left arm was numb  found to have an Aneurysmal ascending aorta measuring 4.4 cm. No dissection, penetrating ulcer or intramural hemorrhage.   pathology.    No acute surgical intervention  f/u CT chest in 6 months  f/u Dr. Olson, [phone]
53 y/o male with no past medical history who presents to the ED with chest pain radiating down left arm and feels the pain shooting through his back. Pt. states he has had the pain 1 day PTA and he thought it would subside but morning of arrival to ED his left arm was numb and he drove himself to the ED. He has not seen a doctor in over 5 years and takes no medications. As per pt, he has a blood pressure cuff at home and when he took his BP over the years it would be in the 180s. 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 Of note, he drinks 4-5 beers every day for past 25 years. Last drink 2/27.   No hx of w/d seizures.     ED course: s/p IV Labetalol 10mg x 2, IV Hydralazine BP persists 190s/130s.     CT angio: IMPRESSION: Aneurysmal ascending aorta measuring 4.4 cm. No dissection, penetrating ulcer or intramural hemorrhage. No acute chest or abdominal pathology.    Xray: Impression: The heart is normal in size. The lungs are clear. Multiple rib fractures   are seen on the left. Age indeterminant. No pneumothorax.    # HTN emergency/Angina  - meds being adjusted- po hydral and po toprol succ BID dosing.  IV hydral prn.   - admit to CICU   - cardio consult - Dr. Hendricks appreciated  - Echo EF 60%, no wma.    # AAA  - IMPRESSION: Aneurysmal ascending aorta measuring 4.4 cm. No dissection,   penetrating ulcer or intramural hemorrhage. No acute chest or abdominal   pathology.  - cardio thoracic consult appreicated. rpt study 6 months  - no previous radiology to compare to    # Left arm pain possibly angina equivalent? possibly related to cervical spine disease however seems less likely given association with spikes in BP.   - Outpt ischemic eval per cardio    # ETOH use  # Anxiety  - dc ciwa  - MVI, thiamine, folic acid (thiamine for possible thiamine deficiency in setting of chronic EtOH)  - discussed etoh withdrawal symptoms, need for cessation  - low dose xanax q8h    # rib fx  - CXR: Multiple rib fractures are seen on the left-age indeterminate fractures  - pain control  - PT when BP stable    # DVT ppx  - with ongoing chest pain and AAA, will hold LMWH/UFH and control BP better. SCDs for now.

## 2018-03-03 ENCOUNTER — TRANSCRIPTION ENCOUNTER (OUTPATIENT)
Age: 53
End: 2018-03-03

## 2018-03-03 VITALS — RESPIRATION RATE: 25 BRPM | SYSTOLIC BLOOD PRESSURE: 168 MMHG | HEART RATE: 80 BPM | DIASTOLIC BLOOD PRESSURE: 111 MMHG

## 2018-03-03 PROBLEM — Z00.00 ENCOUNTER FOR PREVENTIVE HEALTH EXAMINATION: Status: ACTIVE | Noted: 2018-03-03

## 2018-03-03 RX ORDER — HYDRALAZINE HCL 50 MG
1 TABLET ORAL
Qty: 90 | Refills: 0
Start: 2018-03-03 | End: 2018-04-01

## 2018-03-03 RX ORDER — ASPIRIN/CALCIUM CARB/MAGNESIUM 324 MG
1 TABLET ORAL
Qty: 30 | Refills: 0 | OUTPATIENT
Start: 2018-03-03 | End: 2018-04-01

## 2018-03-03 RX ORDER — METOPROLOL TARTRATE 50 MG
1 TABLET ORAL
Qty: 60 | Refills: 0
Start: 2018-03-03 | End: 2018-04-01

## 2018-03-03 RX ORDER — ATORVASTATIN CALCIUM 80 MG/1
1 TABLET, FILM COATED ORAL
Qty: 30 | Refills: 0
Start: 2018-03-03 | End: 2018-04-01

## 2018-03-03 RX ADMIN — Medication 100 MILLIGRAM(S): at 11:14

## 2018-03-03 RX ADMIN — Medication 1 TABLET(S): at 11:15

## 2018-03-03 RX ADMIN — Medication 650 MILLIGRAM(S): at 06:54

## 2018-03-03 RX ADMIN — Medication 1 MILLIGRAM(S): at 11:15

## 2018-03-03 RX ADMIN — Medication 81 MILLIGRAM(S): at 11:15

## 2018-03-03 RX ADMIN — Medication 0.25 MILLIGRAM(S): at 13:01

## 2018-03-03 RX ADMIN — Medication 25 MILLIGRAM(S): at 06:55

## 2018-03-03 RX ADMIN — Medication 25 MILLIGRAM(S): at 13:01

## 2018-03-03 RX ADMIN — Medication 0.25 MILLIGRAM(S): at 06:55

## 2018-03-03 NOTE — DISCHARGE NOTE ADULT - MEDICATION SUMMARY - MEDICATIONS TO TAKE
I will START or STAY ON the medications listed below when I get home from the hospital:    aspirin 81 mg oral tablet, chewable  -- 1 tab(s) by mouth once a day  -- Indication: For Heart attack prevention    atorvastatin 40 mg oral tablet  -- 1 tab(s) by mouth once a day (at bedtime)  -- Indication: For Heart attack prevention    metoprolol succinate 50 mg oral tablet, extended release  -- 1 tab(s) by mouth 2 times a day   -- Indication: For blood pressure    hydrALAZINE 25 mg oral tablet  -- 1 tab(s) by mouth every 8 hours  -- Indication: For blood pressure

## 2018-03-03 NOTE — DISCHARGE NOTE ADULT - PLAN OF CARE
blood pressure control take hydralazine 3 times daily- every 8 hours  take metoprolol 2 times daily in the morning and before bed  follow up with Dr. Hendricks for further medication adjustments in 1-2 weeks Follow up with Dr. Hendricks in 1-2 weeks for stress testing - It is very important to maintain blood pressure control to prevent worsening of the aneurysm.   - Follow up with Dr. Olson in 6 months to repeat CT scan of your chest to make sure the aneuysm is not getting larger.

## 2018-03-03 NOTE — DISCHARGE NOTE ADULT - CARE PROVIDER_API CALL
primary care doctor,   Phone: (   )    -  Fax: (   )    -    Earl Hendricks (MD), Cardiovascular Disease  [address] [place] [address]  Phone: [phone]  Fax: [phone]    Jorge Alberto Olson), Surgery; Thoracic Surgery  [address] [place] [address]  Phone: [phone]  Fax: [phone]

## 2018-03-03 NOTE — DISCHARGE NOTE ADULT - HOSPITAL COURSE
CHIEF COMPLAINT: chest pain    SUBJECTIVE: No EtOH withdrawl.  No further chest or arm pain.  BP still high but better and without spike and drops in BP.    REVIEW OF SYSTEMS: All other review of systems is negative unless indicated above    Vital Signs Last 24 Hrs  T(C): 36.1 (02 Mar 2018 23:25), Max: 36.1 (02 Mar 2018 23:25)  T(F): 96.9 (02 Mar 2018 23:25), Max: 96.9 (02 Mar 2018 23:25)  HR: 79 (03 Mar 2018 10:00) (62 - 95)  BP: 169/113 (03 Mar 2018 10:00) (128/89 - 208/124)  BP(mean): 125 (03 Mar 2018 10:00) (98 - 142)  RR: 18 (03 Mar 2018 08:00) (14 - 18)  SpO2: 98% (03 Mar 2018 07:45) (98% - 98%)    PHYSICAL EXAM:  	GEN: lying in bed, anxious  	HEENT:   NC/AT, no carotid bruits  	CV:  +S1, +S2, RRR  	RESP:  CTA B/L  	GI:  abdomen soft, non-tender, non-distended, normoactive BS  	MSK:   normal muscle tone  	EXT:  no edema  	NEURO:  AAOX3, no focal neurological deficits              SKIN:  no rashes    course/assessment and plan  53 y/o male with no past medical history who presents to the ED with chest pain radiating down left arm and feels the pain shooting through his back. Pt states he has had the pain 1 day PTA and he thought it would subside but morning of arrival to ED his left arm was numb and he drove himself to the ED. He has not seen a doctor in over 5 years and takes no medications. As per pt, he has a blood pressure cuff at home and when he took his BP over the years it would be in the 180s. 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 Of note, he drinks 4-5 beers every day for past 25 years. Last drink 2/27.     # HTN emergency/Angina  - Toprol XL 50mg BID  - Hydralazine 25mg TID  - Aspirin 81 daily  - Lipitor 40 HS  - cardio consult - Dr. Hendricks appreciated- outpatient f/u for BP med management and stress testing  - Echo EF 60%, no wma.    # AAA  - IMPRESSION: Aneurysmal ascending aorta measuring 4.4 cm. No dissection,   penetrating ulcer or intramural hemorrhage. No acute chest or abdominal   pathology.  - cardio thoracic consult appreicated. rpt study 6 months  - no previous radiology to compare to    # Left arm pain possibly angina equivalent? possibly related to cervical spine disease however seems less likely given association with spikes in BP.   - Outpt ischemic eval per cardio    # ETOH use  # Anxiety  - dc ciwa  - MVI, thiamine, folic acid (thiamine for possible thiamine deficiency in setting of chronic EtOH)  - discussed etoh withdrawal symptoms, need for cessation    # rib fx  - CXR: Multiple rib fractures are seen on the left-age indeterminate fractures  - pain control    Time spent on discharge 45 minutes

## 2018-03-03 NOTE — DISCHARGE NOTE ADULT - PATIENT PORTAL LINK FT
You can access the [facility] Patient Portal, offered by [facility], by registering with the following website: [email or web address]

## 2018-03-03 NOTE — DISCHARGE NOTE ADULT - PROVIDER TOKENS
FREE:[LAST:[primary care doctor],PHONE:[(   )    -],FAX:[(   )    -]],TOKEN:'[id number]:'[id number]'

## 2018-03-03 NOTE — DISCHARGE NOTE ADULT - CARE PLAN
Principal Discharge DX:	Hypertensive urgency, malignant  Goal:	blood pressure control  Assessment and plan of treatment:	take hydralazine 3 times daily- every 8 hours  take metoprolol 2 times daily in the morning and before bed  follow up with Dr. Hendricks for further medication adjustments in 1-2 weeks  Secondary Diagnosis:	Chest pain  Assessment and plan of treatment:	Follow up with Dr. Hendricks in 1-2 weeks for stress testing  Secondary Diagnosis:	Aortic aneurysm  Assessment and plan of treatment:	- It is very important to maintain blood pressure control to prevent worsening of the aneurysm.   - Follow up with Dr. Olson in 6 months to repeat CT scan of your chest to make sure the aneuysm is not getting larger.

## 2018-03-07 DIAGNOSIS — Z72.89 OTHER PROBLEMS RELATED TO LIFESTYLE: ICD-10-CM

## 2018-03-07 DIAGNOSIS — I71.4 ABDOMINAL AORTIC ANEURYSM, WITHOUT RUPTURE: ICD-10-CM

## 2018-03-07 DIAGNOSIS — F41.9 ANXIETY DISORDER, UNSPECIFIED: ICD-10-CM

## 2018-03-07 DIAGNOSIS — M50.30 OTHER CERVICAL DISC DEGENERATION, UNSPECIFIED CERVICAL REGION: ICD-10-CM

## 2018-03-07 DIAGNOSIS — I16.1 HYPERTENSIVE EMERGENCY: ICD-10-CM

## 2018-03-07 DIAGNOSIS — E51.8 OTHER MANIFESTATIONS OF THIAMINE DEFICIENCY: ICD-10-CM
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
## 2018-03-07 DIAGNOSIS — S22.42XA MULTIPLE FRACTURES OF RIBS, LEFT SIDE, INITIAL ENCOUNTER FOR CLOSED FRACTURE: ICD-10-CM
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
## 2018-03-07 DIAGNOSIS — I20.9 ANGINA PECTORIS, UNSPECIFIED: ICD-10-CM

## 2018-03-16 ENCOUNTER — APPOINTMENT (OUTPATIENT)
Dept: CARDIOLOGY | Facility: CLINIC | Age: 53
End: 2018-03-16
Payer: COMMERCIAL

## 2018-03-16 ENCOUNTER — NON-APPOINTMENT (OUTPATIENT)
Age: 53
End: 2018-03-16

## 2018-03-16 VITALS — DIASTOLIC BLOOD PRESSURE: 96 MMHG | HEART RATE: 102 BPM | OXYGEN SATURATION: 96 % | SYSTOLIC BLOOD PRESSURE: 129 MMHG

## 2018-03-16 VITALS — HEIGHT: 77 IN | WEIGHT: 233 LBS | BODY MASS INDEX: 27.51 KG/M2

## 2018-03-16 DIAGNOSIS — I10 ESSENTIAL (PRIMARY) HYPERTENSION: ICD-10-CM

## 2018-03-16 PROCEDURE — 93000 ELECTROCARDIOGRAM COMPLETE: CPT

## 2018-03-16 PROCEDURE — 99214 OFFICE O/P EST MOD 30 MIN: CPT | Mod: 25

## 2018-05-18 ENCOUNTER — APPOINTMENT (OUTPATIENT)
Dept: CARDIOLOGY | Facility: CLINIC | Age: 53
End: 2018-05-18

## 2018-05-22 ENCOUNTER — MEDICATION RENEWAL (OUTPATIENT)
Age: 53
End: 2018-05-22

## 2018-05-22 RX ORDER — ASPIRIN ENTERIC COATED TABLETS 81 MG 81 MG/1
81 TABLET, DELAYED RELEASE ORAL
Qty: 90 | Refills: 2 | Status: ACTIVE | COMMUNITY
Start: 2018-03-03 | End: 1900-01-01

## 2018-10-01 ENCOUNTER — MEDICATION RENEWAL (OUTPATIENT)
Age: 53
End: 2018-10-01

## 2018-10-01 ENCOUNTER — RX RENEWAL (OUTPATIENT)
Age: 53
End: 2018-10-01

## 2018-10-01 RX ORDER — METOPROLOL SUCCINATE 50 MG/1
50 TABLET, EXTENDED RELEASE ORAL
Qty: 180 | Refills: 3 | Status: ACTIVE | COMMUNITY
Start: 2018-03-03 | End: 1900-01-01

## 2018-10-01 RX ORDER — HYDRALAZINE HYDROCHLORIDE 25 MG/1
25 TABLET ORAL
Qty: 270 | Refills: 3 | Status: ACTIVE | COMMUNITY
Start: 2018-03-03 | End: 1900-01-01

## 2018-12-31 ENCOUNTER — RX RENEWAL (OUTPATIENT)
Age: 53
End: 2018-12-31

## 2018-12-31 RX ORDER — ATORVASTATIN CALCIUM 40 MG/1
40 TABLET, FILM COATED ORAL
Qty: 90 | Refills: 1 | Status: ACTIVE | COMMUNITY
Start: 2018-03-03 | End: 1900-01-01

## 2019-01-20 NOTE — DISCHARGE NOTE ADULT - CARE PROVIDERS DIRECT ADDRESSES
,DirectAddress_Unknown,[email or web address] No pertinent past medical history

## 2019-10-07 ENCOUNTER — RX RENEWAL (OUTPATIENT)
Age: 54
End: 2019-10-07

## 2020-10-18 ENCOUNTER — EMERGENCY (EMERGENCY)
Facility: HOSPITAL | Age: 55
LOS: 0 days | Discharge: HOME | End: 2020-10-18
Attending: EMERGENCY MEDICINE
Payer: COMMERCIAL

## 2020-10-18 VITALS
TEMPERATURE: 98 F | OXYGEN SATURATION: 99 % | RESPIRATION RATE: 19 BRPM | HEART RATE: 80 BPM | SYSTOLIC BLOOD PRESSURE: 150 MMHG | DIASTOLIC BLOOD PRESSURE: 90 MMHG

## 2020-10-18 VITALS — HEIGHT: 78 IN | WEIGHT: 218.92 LBS

## 2020-10-18 DIAGNOSIS — Z79.82 LONG TERM (CURRENT) USE OF ASPIRIN: ICD-10-CM

## 2020-10-18 DIAGNOSIS — M96.621 FRACTURE OF HUMERUS FOLLOWING INSERTION OF ORTHOPEDIC IMPLANT, JOINT PROSTHESIS, OR BONE PLATE, RIGHT ARM: Chronic | ICD-10-CM

## 2020-10-18 DIAGNOSIS — I10 ESSENTIAL (PRIMARY) HYPERTENSION: ICD-10-CM

## 2020-10-18 DIAGNOSIS — K62.5 HEMORRHAGE OF ANUS AND RECTUM: ICD-10-CM

## 2020-10-18 DIAGNOSIS — K64.8 OTHER HEMORRHOIDS: ICD-10-CM

## 2020-10-18 LAB
ALBUMIN SERPL ELPH-MCNC: 4.2 G/DL — SIGNIFICANT CHANGE UP (ref 3.3–5)
ALP SERPL-CCNC: 117 U/L — SIGNIFICANT CHANGE UP (ref 40–120)
ALT FLD-CCNC: 43 U/L — SIGNIFICANT CHANGE UP (ref 12–78)
ANION GAP SERPL CALC-SCNC: 7 MMOL/L — SIGNIFICANT CHANGE UP (ref 5–17)
AST SERPL-CCNC: 23 U/L — SIGNIFICANT CHANGE UP (ref 15–37)
BASOPHILS # BLD AUTO: 0.05 K/UL — SIGNIFICANT CHANGE UP (ref 0–0.2)
BASOPHILS NFR BLD AUTO: 0.8 % — SIGNIFICANT CHANGE UP (ref 0–2)
BILIRUB SERPL-MCNC: 0.8 MG/DL — SIGNIFICANT CHANGE UP (ref 0.2–1.2)
BUN SERPL-MCNC: 8 MG/DL — SIGNIFICANT CHANGE UP (ref 7–23)
CALCIUM SERPL-MCNC: 9.4 MG/DL — SIGNIFICANT CHANGE UP (ref 8.5–10.1)
CHLORIDE SERPL-SCNC: 101 MMOL/L — SIGNIFICANT CHANGE UP (ref 96–108)
CO2 SERPL-SCNC: 26 MMOL/L — SIGNIFICANT CHANGE UP (ref 22–31)
CREAT SERPL-MCNC: 0.97 MG/DL — SIGNIFICANT CHANGE UP (ref 0.5–1.3)
EOSINOPHIL # BLD AUTO: 0.28 K/UL — SIGNIFICANT CHANGE UP (ref 0–0.5)
EOSINOPHIL NFR BLD AUTO: 4.7 % — SIGNIFICANT CHANGE UP (ref 0–6)
GLUCOSE SERPL-MCNC: 124 MG/DL — HIGH (ref 70–99)
HCT VFR BLD CALC: 47 % — SIGNIFICANT CHANGE UP (ref 39–50)
HGB BLD-MCNC: 16.5 G/DL — SIGNIFICANT CHANGE UP (ref 13–17)
IMM GRANULOCYTES NFR BLD AUTO: 0.3 % — SIGNIFICANT CHANGE UP (ref 0–1.5)
LYMPHOCYTES # BLD AUTO: 0.99 K/UL — LOW (ref 1–3.3)
LYMPHOCYTES # BLD AUTO: 16.8 % — SIGNIFICANT CHANGE UP (ref 13–44)
MCHC RBC-ENTMCNC: 29.7 PG — SIGNIFICANT CHANGE UP (ref 27–34)
MCHC RBC-ENTMCNC: 35.1 GM/DL — SIGNIFICANT CHANGE UP (ref 32–36)
MCV RBC AUTO: 84.7 FL — SIGNIFICANT CHANGE UP (ref 80–100)
MONOCYTES # BLD AUTO: 0.68 K/UL — SIGNIFICANT CHANGE UP (ref 0–0.9)
MONOCYTES NFR BLD AUTO: 11.5 % — SIGNIFICANT CHANGE UP (ref 2–14)
NEUTROPHILS # BLD AUTO: 3.88 K/UL — SIGNIFICANT CHANGE UP (ref 1.8–7.4)
NEUTROPHILS NFR BLD AUTO: 65.9 % — SIGNIFICANT CHANGE UP (ref 43–77)
PLATELET # BLD AUTO: 327 K/UL — SIGNIFICANT CHANGE UP (ref 150–400)
POTASSIUM SERPL-MCNC: 4.4 MMOL/L — SIGNIFICANT CHANGE UP (ref 3.5–5.3)
POTASSIUM SERPL-SCNC: 4.4 MMOL/L — SIGNIFICANT CHANGE UP (ref 3.5–5.3)
PROT SERPL-MCNC: 8.6 GM/DL — HIGH (ref 6–8.3)
RBC # BLD: 5.55 M/UL — SIGNIFICANT CHANGE UP (ref 4.2–5.8)
RBC # FLD: 12.8 % — SIGNIFICANT CHANGE UP (ref 10.3–14.5)
SODIUM SERPL-SCNC: 134 MMOL/L — LOW (ref 135–145)
WBC # BLD: 5.9 K/UL — SIGNIFICANT CHANGE UP (ref 3.8–10.5)
WBC # FLD AUTO: 5.9 K/UL — SIGNIFICANT CHANGE UP (ref 3.8–10.5)

## 2020-10-18 PROCEDURE — 85610 PROTHROMBIN TIME: CPT

## 2020-10-18 PROCEDURE — 85730 THROMBOPLASTIN TIME PARTIAL: CPT

## 2020-10-18 PROCEDURE — 80053 COMPREHEN METABOLIC PANEL: CPT

## 2020-10-18 PROCEDURE — 86850 RBC ANTIBODY SCREEN: CPT

## 2020-10-18 PROCEDURE — 85025 COMPLETE CBC W/AUTO DIFF WBC: CPT

## 2020-10-18 PROCEDURE — 93005 ELECTROCARDIOGRAM TRACING: CPT

## 2020-10-18 PROCEDURE — 99283 EMERGENCY DEPT VISIT LOW MDM: CPT

## 2020-10-18 PROCEDURE — 93010 ELECTROCARDIOGRAM REPORT: CPT

## 2020-10-18 PROCEDURE — 86901 BLOOD TYPING SEROLOGIC RH(D): CPT

## 2020-10-18 PROCEDURE — 36415 COLL VENOUS BLD VENIPUNCTURE: CPT

## 2020-10-18 PROCEDURE — 86900 BLOOD TYPING SEROLOGIC ABO: CPT

## 2020-10-18 RX ORDER — HYDROCORTISONE 1 %
1 OINTMENT (GRAM) TOPICAL
Qty: 14 | Refills: 0
Start: 2020-10-18

## 2020-10-18 RX ORDER — DOCUSATE SODIUM 100 MG
1 CAPSULE ORAL
Qty: 30 | Refills: 0
Start: 2020-10-18

## 2020-10-18 RX ORDER — OXYCODONE AND ACETAMINOPHEN 5; 325 MG/1; MG/1
1 TABLET ORAL ONCE
Refills: 0 | Status: DISCONTINUED | OUTPATIENT
Start: 2020-10-18 | End: 2020-10-18

## 2020-10-18 RX ADMIN — OXYCODONE AND ACETAMINOPHEN 1 TABLET(S): 5; 325 TABLET ORAL at 12:00

## 2020-10-18 NOTE — ED PROVIDER NOTE - PATIENT PORTAL LINK FT
You can access the FollowMyHealth Patient Portal offered by [facility] by registering at the following website: [email or web address]. By joining [employer]’s FollowMyHealth portal, you will also be able to view your health information using other applications (apps) compatible with our system.

## 2020-10-18 NOTE — ED PROVIDER NOTE - CARE PROVIDERS DIRECT ADDRESSES
,[email or web address],DirectAddress_Unknown ,[email or web address]

## 2020-10-18 NOTE — ED PROVIDER NOTE - NSFOLLOWUPINSTRUCTIONS_ED_ALL_ED_FT
Follow-up with your regular physician and/or a GI or colorectal specialist (information above)  Return with any persistent/worsening symptoms. Follow-up with your regular physician and/or a GI or colorectal specialist (information above)  Return with any persistent/worsening symptoms.      Hemorrhoids    WHAT YOU NEED TO KNOW:    Hemorrhoids are swollen blood vessels inside your rectum (internal hemorrhoids) or on your anus (external hemorrhoids). Sometimes a hemorrhoid may prolapse. This means it extends out of your anus.    DISCHARGE INSTRUCTIONS:    Return to the emergency department if:   •You have severe pain in your abdomen and you are vomiting.   •You have bleeding from your anus that soaks through your underwear.     Contact your healthcare provider if:   •You have frequent and painful bowel movements.  •Your hemorrhoid looks or feels more swollen than usual.   •You do not have a bowel movement for 2 days or more.   •You have questions or concerns about your condition or care.    Medicines: You may need any of the following:   •A pad, cream, or ointment can help decrease pain, swelling, and itching.   •Stool softeners help treat or prevent constipation.   •NSAIDs, such as ibuprofen, help decrease swelling, pain, and fever. NSAIDs can cause stomach bleeding or kidney problems in certain people. If you take blood thinner medicine, always ask your healthcare provider if NSAIDs are safe for you. Always read the medicine label and follow directions.  •Take your medicine as directed. Contact your healthcare provider if you think your medicine is not helping or if you have side effects. Tell him or her if you are allergic to any medicine. Keep a list of the medicines, vitamins, and herbs you take. Include the amounts, and when and why you take them. Bring the list or the pill bottles to follow-up visits. Carry your medicine list with you in case of an emergency.    Manage your symptoms:   •Apply ice on your anus for 15 to 20 minutes every hour or as directed. Use an ice pack, or put crushed ice in a plastic bag. Cover it with a towel before you apply it to your anus. Ice helps prevent tissue damage and decreases swelling and pain.  •Take a sitz bath. Fill a bathtub with 4 to 6 inches of warm water. You may also use a sitz bath pan that fits inside a toilet bowl. Sit in the sitz bath for 15 minutes. Do this 3 times a day, and after each bowel movement. The warm water can help decrease pain and swelling.   •Keep your anal area clean. Gently wash the area with warm water daily. Soap may irritate the area. After a bowel movement, wipe with moist towelettes or wet toilet paper. Dry toilet paper can irritate the area.     Prevent hemorrhoids:   •Do not strain to have a bowel movement. Do not sit on the toilet too long. These actions can increase pressure on the tissues in your rectum and anus.   •Drink plenty of liquids. Liquids can help prevent constipation. Ask how much liquid to drink each day and which liquids are best for you.   •Eat a variety of high-fiber foods. Examples include fruits, vegetables, and whole grains. Ask your healthcare provider how much fiber you need each day. You may need to take a fiber supplement.   •Exercise as directed. Exercise, such as walking, may make it easier to have a bowel movement. Ask your healthcare provider to help you create an exercise plan.   •Do not have anal sex. Anal sex can weaken the skin around your rectum and anus.   •Avoid heavy lifting. This can cause straining and increase your risk for another hemorrhoid.     Follow up with your healthcare provider as directed: Write down your questions so you remember to ask them during your visits

## 2020-10-18 NOTE — ED PROVIDER NOTE - OBJECTIVE STATEMENT
55 year old male with a PMHx of HTN, on daily ASA presents to the ED for rectal pain for the past three days. Pt noticed some blood in his stool that day as well. Has not really been eating, states that he has been feeling lightheaded. Admits to drinking 3 beers a day.

## 2020-10-18 NOTE — ED PROVIDER NOTE - PROVIDER TOKENS
PROVIDER:[TOKEN:[[id number]]],PROVIDER:[TOKEN:[[id number]]] PROVIDER:[TOKEN:[[id number]],FOLLOWUP:[1-3 Days]]

## 2020-10-18 NOTE — ED ADULT TRIAGE NOTE - CHIEF COMPLAINT QUOTE
Pt states rectal bleeding, swelling and pain since Friday, states BRB with BM this morning, pt takes daily ASA. Pt also c/o dizziness, denies shortness of breath or chest pain.

## 2020-10-18 NOTE — ED PROVIDER NOTE - CARE PROVIDER_API CALL
Franki Sewell  COLON/RECTAL SURGERY  [address] [place] [address]  Phone: [phone]  Fax: [phone]  Follow Up Time:     Franki Vargas  GASTROENTEROLOGY  [address] [place]  [place] [address]  Phone: [phone]  Fax: [phone]  Follow Up Time:    Dai Smith  COLON/RECTAL SURGERY  [address] [place] [address]  Phone: [phone]  Fax: [phone]  Follow Up Time: 1-3 Days

## 2020-10-19 ENCOUNTER — APPOINTMENT (OUTPATIENT)
Dept: COLORECTAL SURGERY | Facility: CLINIC | Age: 55
End: 2020-10-19
Payer: COMMERCIAL

## 2020-10-19 VITALS
BODY MASS INDEX: 25.86 KG/M2 | SYSTOLIC BLOOD PRESSURE: 178 MMHG | TEMPERATURE: 97.3 F | WEIGHT: 219 LBS | HEIGHT: 77 IN | HEART RATE: 94 BPM | DIASTOLIC BLOOD PRESSURE: 117 MMHG

## 2020-10-19 DIAGNOSIS — Z82.49 FAMILY HISTORY OF ISCHEMIC HEART DISEASE AND OTHER DISEASES OF THE CIRCULATORY SYSTEM: ICD-10-CM

## 2020-10-19 DIAGNOSIS — K64.4 RESIDUAL HEMORRHOIDAL SKIN TAGS: ICD-10-CM

## 2020-10-19 DIAGNOSIS — Z80.9 FAMILY HISTORY OF MALIGNANT NEOPLASM, UNSPECIFIED: ICD-10-CM

## 2020-10-19 PROCEDURE — 46600 DIAGNOSTIC ANOSCOPY SPX: CPT

## 2020-10-19 PROCEDURE — 99243 OFF/OP CNSLTJ NEW/EST LOW 30: CPT | Mod: 25

## 2020-10-19 PROCEDURE — 99072 ADDL SUPL MATRL&STAF TM PHE: CPT

## 2020-10-19 NOTE — PROCEDURE
[FreeTextEntry1] : 10 cc of 1% lidocaine injected into bilateral external hemorrhoids, massaged to reduce edema\par \par Anoscopy performed after pt consent was obtained. Circumferential visualization of the anal canal  above the level of the dentate line was completed.\par

## 2020-10-19 NOTE — PHYSICAL EXAM
[Normal rectal exam] : exam was normal [Reduce Spontaneously] : a spontaneously reducible (grade II) [Tender, Swollen] : tender, swollen [Thrombosed] : that was not thrombosed [Normal] : was normal [None] : there was no rectal mass  [No Rash or Lesion] : No rash or lesion [Alert] : alert [Gross Blood] : no gross blood [Oriented to Person] : oriented to person [Oriented to Time] : oriented to time [Calm] : calm [Oriented to Place] : oriented to place [de-identified] : No apparent distress [de-identified] : Normocephalic atraumatic [de-identified] : B/L edematous external hemorrhoids without obvious thrombosis [de-identified] : Moving all extremities x4

## 2020-10-19 NOTE — ASSESSMENT
[FreeTextEntry1] : Mr. Burrell presents to the office with edematous external hemorrhoids after straining to evacuate his stools.\par In office today, we proceeded to reduce the edema in these columns after administering 1% lidocaine. There was no e/o thrombosis to remove. He was reassured and advised to avoid straining by adhering to a high fiber diet with ample water intake and miralax nightly. He was also advised to avoid heavy lifting through the course of the day, to perform warm soaks for comfort and to use the anusol suppositories. I have also recommended against usage of the donut which will only exacerbate anorectal swelling.\par Finally, when he is feeling improved, he is to return to the office to discuss colonoscopy screening.\par He understands and is agreeable with POC.

## 2020-10-19 NOTE — HISTORY OF PRESENT ILLNESS
[FreeTextEntry1] : Mr. Burrell presents to the office for consultation after having been evaluated in  ED yesterday for external hemorrhoids.  Patient reports that on Friday afternoon, after straining to evacuate his stools, he felt swelling and pain.  Over the course of the weekend, the swelling and pain did not resolve.  This led to ED evaluation yesterday with findings of external hemorrhoids and discharge for follow-up in our offices.  He was also provided with Anusol suppositories twice daily.  He states that on average, bowel movements are passed fairly regularly without incident.  No prior hemorrhoidal issues.  He has been able to pass stools on a daily basis since Friday, but with associated anorectal pain.  He denies significant rectal bleeding and denies any fevers or chills.  No prior colonoscopy.

## 2021-03-20 ENCOUNTER — EMERGENCY (EMERGENCY)
Facility: HOSPITAL | Age: 56
LOS: 0 days | Discharge: ANOTHER HOSPITAL | End: 2021-03-20
Attending: EMERGENCY MEDICINE
Payer: COMMERCIAL

## 2021-03-20 ENCOUNTER — INPATIENT (INPATIENT)
Facility: HOSPITAL | Age: 56
LOS: 7 days | Discharge: HOME | DRG: 163 | End: 2021-03-28
Attending: SURGERY | Admitting: SURGERY
Payer: COMMERCIAL

## 2021-03-20 VITALS
DIASTOLIC BLOOD PRESSURE: 109 MMHG | HEART RATE: 81 BPM | RESPIRATION RATE: 18 BRPM | TEMPERATURE: 98 F | SYSTOLIC BLOOD PRESSURE: 162 MMHG | OXYGEN SATURATION: 98 %

## 2021-03-20 VITALS — HEIGHT: 78 IN

## 2021-03-20 VITALS — HEIGHT: 78 IN | WEIGHT: 265 LBS
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
## 2021-03-20 DIAGNOSIS — M96.621 FRACTURE OF HUMERUS FOLLOWING INSERTION OF ORTHOPEDIC IMPLANT, JOINT PROSTHESIS, OR BONE PLATE, RIGHT ARM: Chronic | ICD-10-CM

## 2021-03-20 DIAGNOSIS — S27.0XXA TRAUMATIC PNEUMOTHORAX, INITIAL ENCOUNTER: ICD-10-CM

## 2021-03-20 DIAGNOSIS — S22.41XA MULTIPLE FRACTURES OF RIBS, RIGHT SIDE, INITIAL ENCOUNTER FOR CLOSED FRACTURE: ICD-10-CM

## 2021-03-20 DIAGNOSIS — R07.9 CHEST PAIN, UNSPECIFIED: ICD-10-CM

## 2021-03-20 DIAGNOSIS — S27.321A CONTUSION OF LUNG, UNILATERAL, INITIAL ENCOUNTER: ICD-10-CM

## 2021-03-20 DIAGNOSIS — I10 ESSENTIAL (PRIMARY) HYPERTENSION: ICD-10-CM
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
## 2021-03-20 DIAGNOSIS — J93.9 PNEUMOTHORAX, UNSPECIFIED: ICD-10-CM

## 2021-03-20 DIAGNOSIS — S29.9XXA UNSPECIFIED INJURY OF THORAX, INITIAL ENCOUNTER: ICD-10-CM

## 2021-03-20 DIAGNOSIS — Z79.82 LONG TERM (CURRENT) USE OF ASPIRIN: ICD-10-CM

## 2021-03-20 DIAGNOSIS — J98.2 INTERSTITIAL EMPHYSEMA: ICD-10-CM
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
## 2021-03-20 LAB
ALBUMIN SERPL ELPH-MCNC: 4.2 G/DL — SIGNIFICANT CHANGE UP (ref 3.3–5)
ALBUMIN SERPL ELPH-MCNC: 4.4 G/DL — SIGNIFICANT CHANGE UP (ref 3.3–5.2)
ALP SERPL-CCNC: 84 U/L — SIGNIFICANT CHANGE UP (ref 40–120)
ALP SERPL-CCNC: 92 U/L — SIGNIFICANT CHANGE UP (ref 40–120)
ALT FLD-CCNC: 23 U/L — SIGNIFICANT CHANGE UP
ALT FLD-CCNC: 35 U/L — SIGNIFICANT CHANGE UP (ref 12–78)
ANION GAP SERPL CALC-SCNC: 11 MMOL/L — SIGNIFICANT CHANGE UP (ref 5–17)
ANION GAP SERPL CALC-SCNC: 6 MMOL/L — SIGNIFICANT CHANGE UP (ref 5–17)
ANISOCYTOSIS BLD QL: SLIGHT — SIGNIFICANT CHANGE UP
APTT BLD: 28.1 SEC — SIGNIFICANT CHANGE UP (ref 27.5–35.5)
APTT BLD: 29.2 SEC — SIGNIFICANT CHANGE UP (ref 27.5–35.5)
AST SERPL-CCNC: 33 U/L — SIGNIFICANT CHANGE UP
AST SERPL-CCNC: 34 U/L — SIGNIFICANT CHANGE UP (ref 15–37)
BASOPHILS # BLD AUTO: 0 K/UL — SIGNIFICANT CHANGE UP (ref 0–0.2)
BASOPHILS # BLD AUTO: 0.05 K/UL — SIGNIFICANT CHANGE UP (ref 0–0.2)
BASOPHILS NFR BLD AUTO: 0 % — SIGNIFICANT CHANGE UP (ref 0–2)
BASOPHILS NFR BLD AUTO: 0.5 % — SIGNIFICANT CHANGE UP (ref 0–2)
BILIRUB SERPL-MCNC: 0.9 MG/DL — SIGNIFICANT CHANGE UP (ref 0.2–1.2)
BILIRUB SERPL-MCNC: 0.9 MG/DL — SIGNIFICANT CHANGE UP (ref 0.4–2)
BLD GP AB SCN SERPL QL: SIGNIFICANT CHANGE UP
BUN SERPL-MCNC: 11 MG/DL — SIGNIFICANT CHANGE UP (ref 8–20)
BUN SERPL-MCNC: 12 MG/DL — SIGNIFICANT CHANGE UP (ref 7–23)
BURR CELLS BLD QL SMEAR: PRESENT — SIGNIFICANT CHANGE UP
CALCIUM SERPL-MCNC: 8.6 MG/DL — SIGNIFICANT CHANGE UP (ref 8.6–10.2)
CALCIUM SERPL-MCNC: 8.9 MG/DL — SIGNIFICANT CHANGE UP (ref 8.5–10.1)
CHLORIDE SERPL-SCNC: 103 MMOL/L — SIGNIFICANT CHANGE UP (ref 96–108)
CHLORIDE SERPL-SCNC: 99 MMOL/L — SIGNIFICANT CHANGE UP (ref 98–107)
CO2 SERPL-SCNC: 25 MMOL/L — SIGNIFICANT CHANGE UP (ref 22–29)
CO2 SERPL-SCNC: 25 MMOL/L — SIGNIFICANT CHANGE UP (ref 22–31)
CREAT SERPL-MCNC: 0.76 MG/DL — SIGNIFICANT CHANGE UP (ref 0.5–1.3)
CREAT SERPL-MCNC: 0.98 MG/DL — SIGNIFICANT CHANGE UP (ref 0.5–1.3)
EOSINOPHIL # BLD AUTO: 0 K/UL — SIGNIFICANT CHANGE UP (ref 0–0.5)
EOSINOPHIL # BLD AUTO: 0.01 K/UL — SIGNIFICANT CHANGE UP (ref 0–0.5)
EOSINOPHIL NFR BLD AUTO: 0 % — SIGNIFICANT CHANGE UP (ref 0–6)
EOSINOPHIL NFR BLD AUTO: 0.1 % — SIGNIFICANT CHANGE UP (ref 0–6)
ETHANOL SERPL-MCNC: <10 MG/DL — SIGNIFICANT CHANGE UP (ref 0–10)
ETHANOL SERPL-MCNC: <10 MG/DL — SIGNIFICANT CHANGE UP (ref 0–9)
GAS PNL BLDA: SIGNIFICANT CHANGE UP
GIANT PLATELETS BLD QL SMEAR: PRESENT — SIGNIFICANT CHANGE UP
GLUCOSE SERPL-MCNC: 130 MG/DL — HIGH (ref 70–99)
GLUCOSE SERPL-MCNC: 138 MG/DL — HIGH (ref 70–99)
HCT VFR BLD CALC: 41 % — SIGNIFICANT CHANGE UP (ref 39–50)
HCT VFR BLD CALC: 42.4 % — SIGNIFICANT CHANGE UP (ref 39–50)
HGB BLD-MCNC: 14 G/DL — SIGNIFICANT CHANGE UP (ref 13–17)
HGB BLD-MCNC: 14.3 G/DL — SIGNIFICANT CHANGE UP (ref 13–17)
IMM GRANULOCYTES NFR BLD AUTO: 0.4 % — SIGNIFICANT CHANGE UP (ref 0–1.5)
INR BLD: 1.05 RATIO — SIGNIFICANT CHANGE UP (ref 0.88–1.16)
INR BLD: 1.08 RATIO — SIGNIFICANT CHANGE UP (ref 0.88–1.16)
LACTATE SERPL-SCNC: 1.6 MMOL/L — SIGNIFICANT CHANGE UP (ref 0.7–2)
LIDOCAIN IGE QN: 13 U/L — LOW (ref 22–51)
LIDOCAIN IGE QN: 61 U/L — LOW (ref 73–393)
LYMPHOCYTES # BLD AUTO: 0.55 K/UL — LOW (ref 1–3.3)
LYMPHOCYTES # BLD AUTO: 0.71 K/UL — LOW (ref 1–3.3)
LYMPHOCYTES # BLD AUTO: 6.1 % — LOW (ref 13–44)
LYMPHOCYTES # BLD AUTO: 6.9 % — LOW (ref 13–44)
MANUAL SMEAR VERIFICATION: SIGNIFICANT CHANGE UP
MCHC RBC-ENTMCNC: 30 PG — SIGNIFICANT CHANGE UP (ref 27–34)
MCHC RBC-ENTMCNC: 30.6 PG — SIGNIFICANT CHANGE UP (ref 27–34)
MCHC RBC-ENTMCNC: 33.7 GM/DL — SIGNIFICANT CHANGE UP (ref 32–36)
MCHC RBC-ENTMCNC: 34.1 GM/DL — SIGNIFICANT CHANGE UP (ref 32–36)
MCV RBC AUTO: 88.9 FL — SIGNIFICANT CHANGE UP (ref 80–100)
MCV RBC AUTO: 89.5 FL — SIGNIFICANT CHANGE UP (ref 80–100)
METAMYELOCYTES # FLD: 0.9 % — HIGH (ref 0–0)
MICROCYTES BLD QL: SLIGHT — SIGNIFICANT CHANGE UP
MONOCYTES # BLD AUTO: 0.88 K/UL — SIGNIFICANT CHANGE UP (ref 0–0.9)
MONOCYTES # BLD AUTO: 0.93 K/UL — HIGH (ref 0–0.9)
MONOCYTES NFR BLD AUTO: 10.4 % — SIGNIFICANT CHANGE UP (ref 2–14)
MONOCYTES NFR BLD AUTO: 8.6 % — SIGNIFICANT CHANGE UP (ref 2–14)
NEUTROPHILS # BLD AUTO: 7.16 K/UL — SIGNIFICANT CHANGE UP (ref 1.8–7.4)
NEUTROPHILS # BLD AUTO: 8.57 K/UL — HIGH (ref 1.8–7.4)
NEUTROPHILS NFR BLD AUTO: 76.5 % — SIGNIFICANT CHANGE UP (ref 43–77)
NEUTROPHILS NFR BLD AUTO: 83.5 % — HIGH (ref 43–77)
NEUTS BAND # BLD: 3.5 % — SIGNIFICANT CHANGE UP (ref 0–8)
OVALOCYTES BLD QL SMEAR: SLIGHT — SIGNIFICANT CHANGE UP
PLAT MORPH BLD: NORMAL — SIGNIFICANT CHANGE UP
PLATELET # BLD AUTO: 254 K/UL — SIGNIFICANT CHANGE UP (ref 150–400)
PLATELET # BLD AUTO: 282 K/UL — SIGNIFICANT CHANGE UP (ref 150–400)
POIKILOCYTOSIS BLD QL AUTO: SLIGHT — SIGNIFICANT CHANGE UP
POLYCHROMASIA BLD QL SMEAR: SLIGHT — SIGNIFICANT CHANGE UP
POTASSIUM SERPL-MCNC: 4 MMOL/L — SIGNIFICANT CHANGE UP (ref 3.5–5.3)
POTASSIUM SERPL-MCNC: 4.1 MMOL/L — SIGNIFICANT CHANGE UP (ref 3.5–5.3)
POTASSIUM SERPL-SCNC: 4 MMOL/L — SIGNIFICANT CHANGE UP (ref 3.5–5.3)
POTASSIUM SERPL-SCNC: 4.1 MMOL/L — SIGNIFICANT CHANGE UP (ref 3.5–5.3)
PROT SERPL-MCNC: 7.1 G/DL — SIGNIFICANT CHANGE UP (ref 6.6–8.7)
PROT SERPL-MCNC: 7.8 GM/DL — SIGNIFICANT CHANGE UP (ref 6–8.3)
PROTHROM AB SERPL-ACNC: 12.1 SEC — SIGNIFICANT CHANGE UP (ref 10.6–13.6)
PROTHROM AB SERPL-ACNC: 12.5 SEC — SIGNIFICANT CHANGE UP (ref 10.6–13.6)
RBC # BLD: 4.58 M/UL — SIGNIFICANT CHANGE UP (ref 4.2–5.8)
RBC # BLD: 4.77 M/UL — SIGNIFICANT CHANGE UP (ref 4.2–5.8)
RBC # FLD: 12.6 % — SIGNIFICANT CHANGE UP (ref 10.3–14.5)
RBC # FLD: 12.7 % — SIGNIFICANT CHANGE UP (ref 10.3–14.5)
RBC BLD AUTO: ABNORMAL
SARS-COV-2 RNA SPEC QL NAA+PROBE: SIGNIFICANT CHANGE UP
SARS-COV-2 RNA SPEC QL NAA+PROBE: SIGNIFICANT CHANGE UP
SODIUM SERPL-SCNC: 134 MMOL/L — LOW (ref 135–145)
SODIUM SERPL-SCNC: 135 MMOL/L — SIGNIFICANT CHANGE UP (ref 135–145)
TROPONIN T SERPL-MCNC: <0.01 NG/ML — SIGNIFICANT CHANGE UP (ref 0–0.06)
VARIANT LYMPHS # BLD: 2.6 % — SIGNIFICANT CHANGE UP (ref 0–6)
WBC # BLD: 10.26 K/UL — SIGNIFICANT CHANGE UP (ref 3.8–10.5)
WBC # BLD: 8.95 K/UL — SIGNIFICANT CHANGE UP (ref 3.8–10.5)
WBC # FLD AUTO: 10.26 K/UL — SIGNIFICANT CHANGE UP (ref 3.8–10.5)
WBC # FLD AUTO: 8.95 K/UL — SIGNIFICANT CHANGE UP (ref 3.8–10.5)

## 2021-03-20 PROCEDURE — 85025 COMPLETE CBC W/AUTO DIFF WBC: CPT

## 2021-03-20 PROCEDURE — 72125 CT NECK SPINE W/O DYE: CPT | Mod: 26

## 2021-03-20 PROCEDURE — 99245 OFF/OP CONSLTJ NEW/EST HI 55: CPT

## 2021-03-20 PROCEDURE — 71045 X-RAY EXAM CHEST 1 VIEW: CPT | Mod: 26

## 2021-03-20 PROCEDURE — 36415 COLL VENOUS BLD VENIPUNCTURE: CPT

## 2021-03-20 PROCEDURE — 70450 CT HEAD/BRAIN W/O DYE: CPT

## 2021-03-20 PROCEDURE — 72125 CT NECK SPINE W/O DYE: CPT

## 2021-03-20 PROCEDURE — 99291 CRITICAL CARE FIRST HOUR: CPT

## 2021-03-20 PROCEDURE — 99254 IP/OBS CNSLTJ NEW/EST MOD 60: CPT

## 2021-03-20 PROCEDURE — 83605 ASSAY OF LACTIC ACID: CPT

## 2021-03-20 PROCEDURE — 74177 CT ABD & PELVIS W/CONTRAST: CPT | Mod: 26

## 2021-03-20 PROCEDURE — 93010 ELECTROCARDIOGRAM REPORT: CPT | Mod: 77

## 2021-03-20 PROCEDURE — 70450 CT HEAD/BRAIN W/O DYE: CPT | Mod: 26

## 2021-03-20 PROCEDURE — 96375 TX/PRO/DX INJ NEW DRUG ADDON: CPT | Mod: XU

## 2021-03-20 PROCEDURE — 80053 COMPREHEN METABOLIC PANEL: CPT

## 2021-03-20 PROCEDURE — 71260 CT THORAX DX C+: CPT | Mod: 26

## 2021-03-20 PROCEDURE — 71045 X-RAY EXAM CHEST 1 VIEW: CPT

## 2021-03-20 PROCEDURE — 99291 CRITICAL CARE FIRST HOUR: CPT | Mod: 25

## 2021-03-20 PROCEDURE — 93010 ELECTROCARDIOGRAM REPORT: CPT

## 2021-03-20 PROCEDURE — 71260 CT THORAX DX C+: CPT

## 2021-03-20 PROCEDURE — 83690 ASSAY OF LIPASE: CPT

## 2021-03-20 PROCEDURE — 80307 DRUG TEST PRSMV CHEM ANLYZR: CPT

## 2021-03-20 PROCEDURE — U0003: CPT

## 2021-03-20 PROCEDURE — 85730 THROMBOPLASTIN TIME PARTIAL: CPT

## 2021-03-20 PROCEDURE — 99223 1ST HOSP IP/OBS HIGH 75: CPT

## 2021-03-20 PROCEDURE — U0005: CPT

## 2021-03-20 PROCEDURE — 85610 PROTHROMBIN TIME: CPT

## 2021-03-20 PROCEDURE — 96365 THER/PROPH/DIAG IV INF INIT: CPT | Mod: XU

## 2021-03-20 PROCEDURE — 74177 CT ABD & PELVIS W/CONTRAST: CPT

## 2021-03-20 PROCEDURE — 93005 ELECTROCARDIOGRAM TRACING: CPT

## 2021-03-20 PROCEDURE — 72170 X-RAY EXAM OF PELVIS: CPT | Mod: 26

## 2021-03-20 PROCEDURE — 96376 TX/PRO/DX INJ SAME DRUG ADON: CPT | Mod: XU

## 2021-03-20 RX ORDER — HYDROMORPHONE HYDROCHLORIDE 2 MG/ML
0.5 INJECTION INTRAMUSCULAR; INTRAVENOUS; SUBCUTANEOUS EVERY 4 HOURS
Refills: 0 | Status: DISCONTINUED | OUTPATIENT
Start: 2021-03-20 | End: 2021-03-20

## 2021-03-20 RX ORDER — MORPHINE SULFATE 50 MG/1
4 CAPSULE, EXTENDED RELEASE ORAL ONCE
Refills: 0 | Status: DISCONTINUED | OUTPATIENT
Start: 2021-03-20 | End: 2021-03-20

## 2021-03-20 RX ORDER — ENOXAPARIN SODIUM 100 MG/ML
40 INJECTION SUBCUTANEOUS DAILY
Refills: 0 | Status: DISCONTINUED | OUTPATIENT
Start: 2021-03-20 | End: 2021-03-23

## 2021-03-20 RX ORDER — LIDOCAINE 4 G/100G
1 CREAM TOPICAL DAILY
Refills: 0 | Status: DISCONTINUED | OUTPATIENT
Start: 2021-03-20 | End: 2021-03-20

## 2021-03-20 RX ORDER — ACETAMINOPHEN 500 MG
650 TABLET ORAL EVERY 6 HOURS
Refills: 0 | Status: DISCONTINUED | OUTPATIENT
Start: 2021-03-20 | End: 2021-03-23

## 2021-03-20 RX ORDER — SODIUM CHLORIDE 9 MG/ML
1000 INJECTION INTRAMUSCULAR; INTRAVENOUS; SUBCUTANEOUS ONCE
Refills: 0 | Status: COMPLETED | OUTPATIENT
Start: 2021-03-20 | End: 2021-03-20

## 2021-03-20 RX ORDER — ONDANSETRON 8 MG/1
4 TABLET, FILM COATED ORAL ONCE
Refills: 0 | Status: COMPLETED | OUTPATIENT
Start: 2021-03-20 | End: 2021-03-20

## 2021-03-20 RX ORDER — HYDROMORPHONE HYDROCHLORIDE 2 MG/ML
1 INJECTION INTRAMUSCULAR; INTRAVENOUS; SUBCUTANEOUS ONCE
Refills: 0 | Status: DISCONTINUED | OUTPATIENT
Start: 2021-03-20 | End: 2021-03-20

## 2021-03-20 RX ORDER — SENNA PLUS 8.6 MG/1
2 TABLET ORAL AT BEDTIME
Refills: 0 | Status: DISCONTINUED | OUTPATIENT
Start: 2021-03-20 | End: 2021-03-23

## 2021-03-20 RX ORDER — OXYCODONE HYDROCHLORIDE 5 MG/1
5 TABLET ORAL EVERY 4 HOURS
Refills: 0 | Status: DISCONTINUED | OUTPATIENT
Start: 2021-03-20 | End: 2021-03-23

## 2021-03-20 RX ORDER — SODIUM CHLORIDE 9 MG/ML
1000 INJECTION, SOLUTION INTRAVENOUS
Refills: 0 | Status: DISCONTINUED | OUTPATIENT
Start: 2021-03-20 | End: 2021-03-20

## 2021-03-20 RX ORDER — INFLUENZA VIRUS VACCINE 15; 15; 15; 15 UG/.5ML; UG/.5ML; UG/.5ML; UG/.5ML
0.5 SUSPENSION INTRAMUSCULAR ONCE
Refills: 0 | Status: DISCONTINUED | OUTPATIENT
Start: 2021-03-20 | End: 2021-03-28

## 2021-03-20 RX ORDER — CHLORHEXIDINE GLUCONATE 213 G/1000ML
1 SOLUTION TOPICAL DAILY
Refills: 0 | Status: DISCONTINUED | OUTPATIENT
Start: 2021-03-20 | End: 2021-03-23

## 2021-03-20 RX ORDER — HYDRALAZINE HCL 50 MG
10 TABLET ORAL ONCE
Refills: 0 | Status: COMPLETED | OUTPATIENT
Start: 2021-03-20 | End: 2021-03-20

## 2021-03-20 RX ORDER — HYDROMORPHONE HYDROCHLORIDE 2 MG/ML
0.5 INJECTION INTRAMUSCULAR; INTRAVENOUS; SUBCUTANEOUS
Refills: 0 | Status: DISCONTINUED | OUTPATIENT
Start: 2021-03-20 | End: 2021-03-23

## 2021-03-20 RX ORDER — ACETAMINOPHEN 500 MG
1000 TABLET ORAL ONCE
Refills: 0 | Status: COMPLETED | OUTPATIENT
Start: 2021-03-20 | End: 2021-03-20

## 2021-03-20 RX ORDER — LIDOCAINE 4 G/100G
1 CREAM TOPICAL EVERY 24 HOURS
Refills: 0 | Status: DISCONTINUED | OUTPATIENT
Start: 2021-03-20 | End: 2021-03-23

## 2021-03-20 RX ORDER — IBUPROFEN 200 MG
600 TABLET ORAL EVERY 6 HOURS
Refills: 0 | Status: DISCONTINUED | OUTPATIENT
Start: 2021-03-20 | End: 2021-03-23

## 2021-03-20 RX ORDER — OXYCODONE HYDROCHLORIDE 5 MG/1
10 TABLET ORAL EVERY 4 HOURS
Refills: 0 | Status: DISCONTINUED | OUTPATIENT
Start: 2021-03-20 | End: 2021-03-23

## 2021-03-20 RX ORDER — LABETALOL HCL 100 MG
10 TABLET ORAL ONCE
Refills: 0 | Status: COMPLETED | OUTPATIENT
Start: 2021-03-20 | End: 2021-03-20

## 2021-03-20 RX ADMIN — Medication 650 MILLIGRAM(S): at 17:52

## 2021-03-20 RX ADMIN — LIDOCAINE 1 PATCH: 4 CREAM TOPICAL at 09:33

## 2021-03-20 RX ADMIN — HYDROMORPHONE HYDROCHLORIDE 1 MILLIGRAM(S): 2 INJECTION INTRAMUSCULAR; INTRAVENOUS; SUBCUTANEOUS at 16:17

## 2021-03-20 RX ADMIN — MORPHINE SULFATE 4 MILLIGRAM(S): 50 CAPSULE, EXTENDED RELEASE ORAL at 07:00

## 2021-03-20 RX ADMIN — SODIUM CHLORIDE 1000 MILLILITER(S): 9 INJECTION INTRAMUSCULAR; INTRAVENOUS; SUBCUTANEOUS at 06:40

## 2021-03-20 RX ADMIN — Medication 600 MILLIGRAM(S): at 19:14

## 2021-03-20 RX ADMIN — HYDROMORPHONE HYDROCHLORIDE 1 MILLIGRAM(S): 2 INJECTION INTRAMUSCULAR; INTRAVENOUS; SUBCUTANEOUS at 16:34

## 2021-03-20 RX ADMIN — Medication 1000 MILLIGRAM(S): at 08:37

## 2021-03-20 RX ADMIN — LIDOCAINE 1 PATCH: 4 CREAM TOPICAL at 14:17

## 2021-03-20 RX ADMIN — ONDANSETRON 4 MILLIGRAM(S): 8 TABLET, FILM COATED ORAL at 06:21

## 2021-03-20 RX ADMIN — Medication 600 MILLIGRAM(S): at 17:52

## 2021-03-20 RX ADMIN — HYDROMORPHONE HYDROCHLORIDE 0.5 MILLIGRAM(S): 2 INJECTION INTRAMUSCULAR; INTRAVENOUS; SUBCUTANEOUS at 14:13

## 2021-03-20 RX ADMIN — SODIUM CHLORIDE 125 MILLILITER(S): 9 INJECTION, SOLUTION INTRAVENOUS at 08:07

## 2021-03-20 RX ADMIN — Medication 10 MILLIGRAM(S): at 14:13

## 2021-03-20 RX ADMIN — OXYCODONE HYDROCHLORIDE 10 MILLIGRAM(S): 5 TABLET ORAL at 14:20

## 2021-03-20 RX ADMIN — OXYCODONE HYDROCHLORIDE 10 MILLIGRAM(S): 5 TABLET ORAL at 13:19

## 2021-03-20 RX ADMIN — SODIUM CHLORIDE 1000 MILLILITER(S): 9 INJECTION INTRAMUSCULAR; INTRAVENOUS; SUBCUTANEOUS at 07:15

## 2021-03-20 RX ADMIN — Medication 650 MILLIGRAM(S): at 19:14

## 2021-03-20 RX ADMIN — Medication 400 MILLIGRAM(S): at 11:57

## 2021-03-20 RX ADMIN — OXYCODONE HYDROCHLORIDE 10 MILLIGRAM(S): 5 TABLET ORAL at 21:31

## 2021-03-20 RX ADMIN — HYDROMORPHONE HYDROCHLORIDE 1 MILLIGRAM(S): 2 INJECTION INTRAMUSCULAR; INTRAVENOUS; SUBCUTANEOUS at 09:50

## 2021-03-20 RX ADMIN — Medication 400 MILLIGRAM(S): at 08:07

## 2021-03-20 RX ADMIN — Medication 1000 MILLIGRAM(S): at 08:23

## 2021-03-20 RX ADMIN — OXYCODONE HYDROCHLORIDE 10 MILLIGRAM(S): 5 TABLET ORAL at 17:45

## 2021-03-20 RX ADMIN — Medication 10 MILLIGRAM(S): at 16:16

## 2021-03-20 RX ADMIN — HYDROMORPHONE HYDROCHLORIDE 0.5 MILLIGRAM(S): 2 INJECTION INTRAMUSCULAR; INTRAVENOUS; SUBCUTANEOUS at 14:30

## 2021-03-20 RX ADMIN — MORPHINE SULFATE 4 MILLIGRAM(S): 50 CAPSULE, EXTENDED RELEASE ORAL at 06:40

## 2021-03-20 RX ADMIN — HYDROMORPHONE HYDROCHLORIDE 1 MILLIGRAM(S): 2 INJECTION INTRAMUSCULAR; INTRAVENOUS; SUBCUTANEOUS at 11:21

## 2021-03-20 RX ADMIN — MORPHINE SULFATE 4 MILLIGRAM(S): 50 CAPSULE, EXTENDED RELEASE ORAL at 06:21

## 2021-03-20 RX ADMIN — LIDOCAINE 1 PATCH: 4 CREAM TOPICAL at 19:34

## 2021-03-20 RX ADMIN — Medication 1000 MILLIGRAM(S): at 13:44
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
## 2021-03-20 NOTE — ED PROVIDER NOTE - NS ED ATTENDING STATEMENT MOD
I have personally seen and examined this patient.  I have fully participated in the care of this patient. I have reviewed all pertinent clinical information, including history, physical exam, plan and the Resident’s note and agree except as noted. I have personally provided the amount of critical care time documented below excluding time spent on separate procedures.

## 2021-03-20 NOTE — ED PROVIDER NOTE - CONSTITUTIONAL, MLM
normal... Well appearing, awake, alert, oriented to person, place, time/situation and in moderate pain distress.

## 2021-03-20 NOTE — ED PROVIDER NOTE - UNABLE TO OBTAIN
Severe Illness/Injury Complete history unobtainable secondary to pt condition. ROS unobtainable secondary to pt condition

## 2021-03-20 NOTE — ED ADULT NURSE NOTE - NSIMPLEMENTINTERV_GEN_ALL_ED
Implemented All Fall Risk Interventions:  [place] to call system. Call bell, personal items and telephone within reach. Instruct patient to call for assistance. Room bathroom lighting operational. Non-slip footwear when patient is off stretcher. Physically safe environment: no spills, clutter or unnecessary equipment. Stretcher in lowest position, wheels locked, appropriate side rails in place. Provide visual cue, wrist band, yellow gown, etc. Monitor gait and stability. Monitor for mental status changes and reorient to person, place, and time. Review medications for side effects contributing to fall risk. Reinforce activity limits and safety measures with patient and family.
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
## 2021-03-20 NOTE — ED PROVIDER NOTE - PHYSICAL EXAMINATION
Constitutional: middle aged male laying in bed in severe distress  Eyes: PERRLA EOMI  Head: Normocephalic atraumatic  Mouth: MMM  Cardiac: regular rate   Resp: Lungs CTAB  GI: Abd s/nt/nd, no rebound or guarding.  Neuro: awake, alert and oriented, PARKER x4 in severe pain.   Skin: No rashes +abrasion on head +abrasions b/l thighs  Musculoskeletal: +neck minimally tender, no step off or deformity, pt immediately place din C-collar. +bruising and swelling on right lateral aspect of upper and lower stacey with crepitus, no ecchymosis. chest and abdomen along anterior aspect nontender to palpation however pt is distracting injury as back is very tender
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
## 2021-03-20 NOTE — ED PROVIDER NOTE - OBJECTIVE STATEMENT
The patient is a 55 year old male transferred from [facility] for multiple right sided rib fx with PTX

## 2021-03-20 NOTE — ED ADULT NURSE NOTE - OBJECTIVE STATEMENT
Patient presents to ED at  at 0603. Pt fell down 14 stairs at approx 0330 this morning, drove himself and ambulated into ED. Pt in 10/10 pain throughout his entire body, worse on palpation to posterior right flank/ribs. On auscultation, pt with crackling to the right base of the lung. Pt brought to CT, upgraded to a code trauma as a result of CT findings. Pt with free floating air in alvarado and chest, +pneumomediastinum as per trauma team. Pt co pain to head, neck, right arm, right posterior flank region, bilateral legs Patient presents to ED at  at 0603. Pt fell down 14 stairs at approx 0330 this morning, drove himself and ambulated into ED. Pt in 10/10 pain throughout his entire body, worse on palpation to posterior right flank/ribs. On auscultation, pt with crackling to the right base of the lung. Pt brought to CT, upgraded to a code trauma as a result of CT findings. Pt with free floating air in alvarado and chest, +pneumomediastinum as per trauma team. Pt co pain to head, neck, right arm, right posterior flank region, bilateral legs. no bleeding noted. Pt was placed in a c-collar prior to being transported t CT scan fr head, neck, and back pain.     CONSTITUTIONAL - pt in outward discomfort. unable to lay flat.    EYES - Pupils equal, round, reactive to light, and accomodation EOM intact.   HEENT - normocephalic, brusiing/swelling noted to the top of the head, oropharynx clear and moist, nose normal, L/R external ear normal, uvula midline. swelling noted to the neck  NECK- No JVD , tracheal difficult to be viewed due to neck swelling, +neck stiffness and pain  CARDIAC - normal rate, regular rhythm, heart sounds distant   RESPIRATORY- pt tachypneic, short of breath, + pain with breathing. diminished breath sounds on right lower lobe, + crackling t right lower lobe.   GASTROINTESTINAL - abdomen soft, non-tender, and non-distended. Bowel sounds present.  GENITOURINARY - no urinary symptoms   MUSCULOSKELETAL - range of motion 5/5 in left upper, left lower and right lower extremity. Pt with limited ROM in riht upper extremity due to pain  NEUROLOGICAL - sensation is normal, stength limited as mentioned above. +LOC, anox4  SKIN - normal color for race, warm, dry and intact, bruising to bilateral legs, bruising to right flank region, bruising to top of head  PERIPHERAL VASCULAR - Capillary refill less than 2 seconds, + 2pulses radial.   PSYCHIATRIC - Mood and Affect normal, behavior normal, denies SI / HI  ]

## 2021-03-20 NOTE — ED PROVIDER NOTE - CRITICAL CARE ATTENDING CONTRIBUTION TO CARE
Inga AGUILAR M.D. independently provided 35 minutes of critical care including but not limited to talking to consultants, speaking with patient and family and reviewing lab and radiology results.

## 2021-03-20 NOTE — H&P ADULT - ATTENDING COMMENTS
I HAVE SEEN AND EXAMINED THE PATIENT WITH THE RESIDENT PHYSICIAN/NP/PA.  I AGREE WITH THE PA'S NOTE, ASSESSMENT, PLAN AND PE.  BELAT

## 2021-03-20 NOTE — ED ADULT NURSE NOTE - CHIEF COMPLAINT QUOTE
pt transfer from [facility] s/p fall from approx 14 steps, +LOC, transfer with multiple rib fractures. trauma B activated, directed to trauma room

## 2021-03-20 NOTE — ED ADULT NURSE REASSESSMENT NOTE - NS ED NURSE REASSESS COMMENT FT1
report given to [place] ED RN, pt A&O x4, states he's managing pain and declines medication at this time, request to be medicated prior to transfer. Dr. Key remains in ED for pt monitoring and states no chest tube at this time per Dr. Olivera, thoracic.

## 2021-03-20 NOTE — H&P ADULT - HISTORY OF PRESENT ILLNESS
HPI: 55M w/ no significant PMHx presenting as transfer from [place] after fall from stairs about 14ft. Patient denies    R chest tender  b/l breath, crackles  224/124 70hr 100%  supque air b/l ant chest and b/l neck  3am    [id number], r shoulder HPI: 55M w/ no significant PMHx presenting as transfer from [place] after fall from stairs about 14ft. Patient denies hitting head or LOC. On presentation to [place] was found to have multiple rib fractures with bilateral pneumothoraces as well as subcutaneous air at chest wall. On arrival to trauma bay, his only complaint is chest pain and tightness. Denies n/v, f/c, abdominal pain.    PMH: denies  PSH: right humeral fracture after traumatic injury  Meds: [id number]    A: Protected, patient conversating  B: CTAB. Symmetrical chest rise  C: 2+ central (femoral) & peripheral pulses (Radial, DP)  D: GCS 15, MAEO, interacting. No nguyen disability noted  E: No gross deformities on primary exposure    Vitals:   HR: 70 BP: 224/124 RR: 18  SpO2: 100%    CXR: pneumothorax b/l, R>L

## 2021-03-20 NOTE — ED ADULT NURSE REASSESSMENT NOTE - NS ED NURSE REASSESS COMMENT FT1
recvd pt from night RN, A&O x4 in bed with rails up, cardiac monitor in place, c collar in place. Pt laying flat per pt request, c/o R sided back pain with improvement after morphine. ICU at bedside.

## 2021-03-20 NOTE — PROGRESS NOTE ADULT - SUBJECTIVE AND OBJECTIVE BOX
Code trauma, notified 3/20/21 633am, attending bedside 640am    Pt s/p fall down 14 steps, backwards, + LOC. Fall occurred at 330am per pt. Pt drove in to ER for evaluation. Code trauma called 633am Pt c/o pain to right chest wall, swelling to neck/face/chest post trauma.   Pt seen and examined at bedside with chaperone. Pt is AAOx3, pt in no acute distress. Pt denied c/o fever, chills, SOB, abd pain, N/V/D, extremity pain or dysfunction, hemoptysis, hematemesis, hematuria, hematochexia, headache, diplopia, vertigo, dizzyness.    ROS: as abovementioned otherwise negative    PMH: ascending aortic aneurysm, htn  PSH: right humerus repair  Allergies    No Known Allergies    Intolerances    SH: pt drinks 4 beers daily, denied tobacco or illicit drug use  FH: cad      Vital Signs Last 24 Hrs, see trauma flow sheet  T(C): 36.7 (20 Mar 2021 06:17), Max: 36.7 (20 Mar 2021 06:17)  T(F): 98 (20 Mar 2021 06:17), Max: 98 (20 Mar 2021 06:17)  HR: 80 (20 Mar 2021 08:14) (80 - 91)  BP: 166/109 (20 Mar 2021 08:14) (140/105 - 199/118)  BP(mean): 125 (20 Mar 2021 08:14) (125 - 125)  RR: 17 (20 Mar 2021 08:14) (17 - 28)  SpO2: 96% (20 Mar 2021 08:14) (92% - 98%)    Labs:                                14.3   10.26 )-----------( 282      ( 20 Mar 2021 06:17 )             42.4     CBC Full  -  ( 20 Mar 2021 06:17 )  WBC Count : 10.26 K/uL  RBC Count : 4.77 M/uL  Hemoglobin : 14.3 g/dL  Hematocrit : 42.4 %  Platelet Count - Automated : 282 K/uL  Mean Cell Volume : 88.9 fl  Mean Cell Hemoglobin : 30.0 pg  Mean Cell Hemoglobin Concentration : 33.7 gm/dL  Auto Neutrophil # : 8.57 K/uL  Auto Lymphocyte # : 0.71 K/uL  Auto Monocyte # : 0.88 K/uL  Auto Eosinophil # : 0.01 K/uL  Auto Basophil # : 0.05 K/uL  Auto Neutrophil % : 83.5 %  Auto Lymphocyte % : 6.9 %  Auto Monocyte % : 8.6 %  Auto Eosinophil % : 0.1 %  Auto Basophil % : 0.5 %    03-20    134<L>  |  103  |  12  ----------------------------<  138<H>  4.0   |  25  |  0.98    Ca    8.9      20 Mar 2021 06:17    TPro  7.8  /  Alb  4.2  /  TBili  0.9  /  DBili  x   /  AST  34  /  ALT  35  /  AlkPhos  92  03-20    LIVER FUNCTIONS - ( 20 Mar 2021 06:17 )  Alb: 4.2 g/dL / Pro: 7.8 gm/dL / ALK PHOS: 92 U/L / ALT: 35 U/L / AST: 34 U/L / GGT: x           PT/INR - ( 20 Mar 2021 06:17 )   PT: 12.1 sec;   INR: 1.05 ratio         PTT - ( 20 Mar 2021 06:17 )  PTT:28.1 sec      Meds:  HYDROmorphone  Injectable 0.5 milliGRAM(s) IV Push every 4 hours PRN  HYDROmorphone  Injectable 1 milliGRAM(s) IV Push Once PRN  lactated ringers. 1000 milliLiter(s) IV Continuous <Continuous>  lidocaine   Patch 1 Patch Transdermal daily  LORazepam   Injectable 2 milliGRAM(s) IV Push every 2 hours PRN      Radiology:  < from: CT Abdomen and Pelvis w/ IV Cont (03.20.21 @ 06:47) >  EXAM:  CT ABDOMEN AND PELVIS IC                          EXAM:  CT CHEST IC                            PROCEDURE DATE:  03/20/2021          INTERPRETATION:  CT of the chest, abdomen and pelvis    Indication: Trauma. Spiral stairs, landing on his back complaining of right-sided rib, abdominal pain    Technique: Axial images were obtained from the thoracic inlet through pubic symphysis with IV contrast.  90 cc of Omnipaque 350 was administered intravenously without complication and 10 cc was discarded.  Reformatted coronal and sagittal images were submitted.    Comparison: Chest CT of February 28, 2018.    FINDINGS:    The visualized neck, axilla and subcutaneous tissues are unremarkable.    The tracheobronchial tree is patent centrally.  There is no mediastinal hematoma.  The ascending aorta is mildly dilated measuring 4.4 cm..  There is no significant mediastinal or hilar adenopathy. There is a moderate pneumomediastinum.    The heart is not enlarged. Compared to the prior study of February 28, 2018, the heart appears slightly compressed by pneumomediastinum without conization to suggest tamponade not at this time. There is no pericardial effusion.    Lungs: Patchy airspace and groundglass opacities in the right lower lobe compatible with contusions.    Pleura: There are tiny bilateral pneumothoraces. There is a mild right complex pleural effusion.  There is no free air or focal collection.  No free fluid.    Liver: Normal.  Spleen: Normal.  Gallbladder: Unremarkable.  Biliary tree: Unremarkable.  Adrenal glands: Normal.  Pancreas: Normal.  Kidneys: Normal.    Bowel:  There is no small bowel obstruction.  The appendix is unremarkable. The colon is underdistended without significant fecal load. There is diffuse colonic diverticulosis.    Bladder: Decompressed.  Pelvic organs: Prostate is not enlarged.    There is no significant adenopathy.  Vasculature: The aorta is not dilated.    Retroperitoneum: There is no mass.  Bones: 2 part fractures of the right fourth rib, including a posterior fracture fragment that projects into the pleura and mildly displaced fracture anteriorly. 2 part fractures of the posterior and lateral fifth rib with small fragments that project into the pleural space. 2 part fractures of the posterior and lateral sixth rib with bone fragments that project into the pleura. 2 part fracture of the seventh posterior and lateral seventh rib with complete anterior displacement of the lateral fragment. There are 2 severely comminuted fractures of the right posterior eighth rib including a 2 cm bone fragment that projects into the right hemithorax. Three-part comminuted fracture of the right ninth rib with some bone fragments that mildly projected into the pleural space. Old fracture of the left lateral fourth fifth, and sixth ribs. Postsurgical changes in the right proximal humerus including a lateral sideplate and screws throughout the right humeral shaft.  Subcutaneous tissues: There is a large amount of subcutaneous emphysema throughout the neck, chest, and abdomen. Moderate subcutaneous hematoma about the right posterior flank subcutaneous tissues without blush to suggest active bleeding. Small fat-containing umbilical hernia. Fat-containing left inguinal hernia.    IMPRESSION:    Numerous comminuted multi-part displaced right rib fractures involving fourth through ninth ribs, with many bone fragments that project into the pleural space, resulting in a mild complex right pleural effusion and trace bilateral pneumothoraces. Mild areas of right lower lobe pulmonary contusions. Moderate pneumomediastinum resulting in slight mass effect on the heart without conization at this time. Extensive diffuse subcutaneous emphysema.    No evidence of active bleeding.  No evidence of acute traumatic injury to the abdomen or pelvis.    Findings discussed with Dr. Harley at 7:15 am on 3/20/21 with RBV.    Incidental note of a 4.4 cm ascending aortic aneurysm.            JUDAH CARTY MD; Attending Radiologist  This document has been electronically signed. Mar 20 2021  7:33AM    < end of copied text >  < from: CT Head No Cont (03.20.21 @ 06:43) >  EXAM:  CT CERVICAL SPINE                          EXAM:  CT BRAIN                            PROCEDURE DATE:  03/20/2021          INTERPRETATION:  CT OF THE BRAIN AND CERVICAL SPINE    INDICATION: Trauma with head and neck pain    TECHNIQUE:  BRAIN: Axial images were obtained, along with reformatted coronal and sagittal images.  CERVICAL SPINE: Axial thin cut images were obtained, along with reformatted coronal and sagittal images.    COMPARISON: None    FINDINGS:    BRAIN:  There is no intracranial hemorrhage, mass effect, midline shift or large acute cortical infarct.    There is mild chronic cerebral volume loss with commensurate dilatation of the CSF spaces. Mild white matter lucencies probably represent microvascular ischemic disease. There is an empty sella.    There is no significant extracranial soft tissue swelling. There is extensive soft tissue emphysema about the left temporal region, bilateral mid face, right face and posterior neck  There is no depressed skull fracture. Partial opacification of the ethmoid air cells. Mild mucosal thickening of the left frontal sinus.  The mastoid air cells are well aerated..    The orbits are unremarkable.    CERVICAL SPINE:  There is no acute displaced fracture or traumatic malalignment.  There is no prevertebral soft tissue swelling.    There is nonspecific straightening of the cervical lordosis. Vertebral heights are grossly maintained. There is mild multilevel disc space narrowing between C3-T1. Mild posterior osteophytes between C5/C6 results in mild indentation of the anterior thecal sac. Mild to moderate facet and uncinate hypertrophy results in multilevel moderate bilateral neuroforaminal narrowing. There is diffuse subcutaneous emphysema.    IMPRESSION:    BRAIN: No intracranial hemorrhage, mass effect or depressed skull fracture. Chronic atrophy. Extensive subcutaneous emphysema. Please refer to chest CT report for description of rib fractures that are the origin of the soft tissue gas.  CERVICAL SPINE: No displaced fracture or traumatic malalignment.              JUDAH CARTY MD; Attending Radiologist  This document has been electronically signed. Mar 20 2021  7:31AM    < end of copied text >      Physical exam:  Pt is aaox3  Pt in no acute distress  Psych: normal affect  GCS 15  Neuro: CNII-XII grossly intact  HEENT: NCAT, AMANDEEP, EOM wnl. No gross craniofacial bony pathology to exam. No epistaxis or otorrhea to exam b/l  Neck: + subcut emphysema b/l, no hematoma or ecchymosis, no tracheal deviation  Resp: CTAB  CVS: S1S2(+)  Chest: + tenderness to right post and lateral 4-10th ribs from known fracture pathology. (+) extensive subcut emphysema to thorax on right  ABD: bowel sounds (+), soft, non distended, no rebound, no guarding, no rigidity, no skin changes to exam. No tenderness to exam, no pelvic instability to exam  EXT: no calf tenderness or edema to exam b/l, on VTE prophylaxis. No gross long bone pathology or tenderness to exam. Sensoromotor function grossly intact  Skin: + sub cut emphysema to right flank, chest wall, back, b/l neck to face

## 2021-03-20 NOTE — ED PROVIDER NOTE - ATTENDING CONTRIBUTION TO CARE
The patient seen immediately for critical conditions    Multiple rib fractures  Pneumothorax    I, Marcos Helton, performed the initial face to face bedside interview with this patient regarding history of present illness, review of symptoms and relevant past medical, social and family history.  I completed an independent physical examination.  I was the initial provider who evaluated this patient. I have signed out the follow up of any pending tests (i.e. labs, radiological studies) to the resident.  I have communicated the patient’s plan of care and disposition with the resident.

## 2021-03-20 NOTE — H&P ADULT - NSHPPHYSICALEXAM_GEN_ALL_CORE
Constitutional: Well-developed well nourished Male in no acute distress  HEENT: Head is normocephalic and atraumatic, maxillofacial structures stable, no blood or discharge from nares or oral cavity, no herndon sign / racoon eyes, EOMI b/l, pupils [2 ]mm round and reactive to light b/l, no active drainage or redness  Neck: trachea midline, subcutaneous emphysema on palpation of left neck  Respiratory: Breath sounds bilaterally, crackles bilaterally, no intercostal retractions  Cardiovascular: Regular rate & rhythm, +S1, S1, right Chest wall is tender to palpation palpation, crepitus at anterior right and left chest walls  Gastrointestinal: Abdomen soft, non-tender, non-distended, no rebound tenderness / guarding, no ecchymosis or external signs of abdominal trauma  Musculoskeletal: moving all extremities spontaneously, no point tenderness or deformity noted to upper or lower extremities b/l  Pelvis: stable  Vascular: 2+ radial, femoral, and DP pulses b/l  Neurological: GCS: 15 (4/5/6). A&O x 3; no gross sensory / motor / coordination deficits  Musculoskeletal: 5/5 strength of upper and lower extremities b/l  Neuropsinal: no C/T/LS spine tenderness to palpation, no step-offs or signs of external trauma to the back

## 2021-03-20 NOTE — ED PROVIDER NOTE - CRITICAL CARE ATTENDING CONTRIBUTION TO CARE
The patient seen and examined for critical conditions and multiple re-visits to stabilize the patient    Rib fractures  Pneumothorax

## 2021-03-20 NOTE — ED PROVIDER NOTE - CARE PLAN
Principal Discharge DX:	Hemopneumothorax, right  Secondary Diagnosis:	Rib fractures  Secondary Diagnosis:	Pneumomediastinum

## 2021-03-20 NOTE — H&P ADULT - ASSESSMENT
55M w/ no significant PMHx presenting as transfer from [facility] after fall from 14ft and found to have multiple rib fractures and b/l pneumothorax. Right chest tube placed in trauma bay. Plan for SICU admission.    -F/u COVID  -admit to SICU  -CT to continuous suction  -  -pain control as needed

## 2021-03-20 NOTE — ED PROVIDER NOTE - OBJECTIVE STATEMENT
56 y/o male with a PMHx of HTN on daily ASA, right humerus fracture s/p surgical repair, presents ambulatory to the ED s/p fall today. Pt states he was taking the garbage out and fell down approximately 14 steps +LOC +head strike. Pt reports this occurred at 3:30 in the AM then went inside to get dressed and drove himself to the hospital. Pt c/o intense right-sided back pain and neck pain. Takes ASA daily. Hx limited due to pt condition.

## 2021-03-20 NOTE — CONSULT NOTE ADULT - ASSESSMENT
55y Male h/o ascending aortic aneurysm s/p fall 3:30am today down 14 stairs, spiral staircase after slipping at top. Drove to ER w c/o right chest pain, tightness and swelling of chest, neck, face. Found on CT to have numerous comminuted multi-part displaced right rib fractures involving fourth through ninth ribs, with many bone fragments that project into the pleural space, resulting in a mild complex right pleural effusion and trace bilateral pneumothoraces. Mild areas of right lower lobe pulmonary contusions. Moderate pneumomediastinum resulting in slight mass effect on the heart and Extensive diffuse subcutaneous emphysema    D/w Dr. Olivera, no chest tube required at this time, watch closely, if develops PTX will require right chest tube,  ECHO  pain control  transfer to [facility] trauma  D/w trauma attending

## 2021-03-20 NOTE — H&P ADULT - NSHPLABSRESULTS_GEN_ALL_CORE
< from: Xray Chest 1 View-PORTABLE IMMEDIATE (Xray Chest 1 View-PORTABLE IMMEDIATE .) (03.20.21 @ 07:03) >    Impression:    There is extensive pneumomediastinum with associated subcutaneous air throughout the neck and chest bilaterally. There are tiny bilateral pneumothoraces. Comminuted rib fractures are seen bilaterally. Heart size within normal limits. Patient is status post fixation of the right humerus.    < end of copied text >    < from: CT Abdomen and Pelvis w/ IV Cont (03.20.21 @ 06:47) >    IMPRESSION:    Numerous comminuted multi-part displaced right rib fractures involving fourth through ninth ribs, with many bone fragments that project into the pleural space, resulting in a mild complex right pleural effusion and trace bilateral pneumothoraces. Mild areas of right lower lobe pulmonary contusions. Moderate pneumomediastinum resulting in slight mass effect on the heart without conization at this time. Extensive diffuse subcutaneous emphysema.    No evidence of active bleeding.  No evidence of acute traumatic injury to the abdomen or pelvis.    Findings discussed with Dr. Harley at 7:15 am on 3/20/21 with RBV.    Incidental note of a 4.4 cm ascending aortic aneurysm.      < end of copied text >

## 2021-03-20 NOTE — PROGRESS NOTE ADULT - ASSESSMENT
A/P   Right 4-9th rib fractures, complex  Right small hemopneumothorax  Left small pneumothorax  Moderate pneumomediastinum  Extensive subcutaneous emphysema  S/P fall down 14 steps, + LOC  Upon review of films, Dr Olivera, thoracic surgery, has requested pt transfer to higher level of care, [facility]  Transfer center contacted, pt accepted at [facility] under Dr Hyde of trauma  Thoracic surgery to place chest tube per request of accepting [facility] trauma attending prior to transfer, thoracic PA aware  Pt stable and cleared at this time for transfer to tertiary care center, pt currently HD stable  Cont hd monitoring, pain control

## 2021-03-20 NOTE — ED ADULT TRIAGE NOTE - CHIEF COMPLAINT QUOTE
Pt c/o fall. pt reports he slipped off spiral stairs falling approximate 14 ft, landing on his back. pain to back, neck and right side ribs. Denies LOC, Takes daily ASA. Pt also c/o swelling to neck/chin. Trauma alert called

## 2021-03-20 NOTE — ED PROVIDER NOTE - CLINICAL SUMMARY MEDICAL DECISION MAKING FREE TEXT BOX
54 y/o male s/p fall, trauma alert immediately called. Trauma resident called immediately for consultation, Give morphine, blood work obtained, sent to CT which showed pneumomediastinum and multiple rib fractures. On first glance pt O2 sat 90% on RA, FAST exam limited as pt with pneumomediastinum so unable to visualize. CT showed small PNX. Trauma at bedside evaluating and discussing with radiology. Trauma to admit patient however felt that he would be better suited at a hospital with higher level of care. Dr. Harley transferring pt to [facility]. Pain controlled.

## 2021-03-21 LAB
ANION GAP SERPL CALC-SCNC: 9 MMOL/L — SIGNIFICANT CHANGE UP (ref 5–17)
BASOPHILS # BLD AUTO: 0.03 K/UL — SIGNIFICANT CHANGE UP (ref 0–0.2)
BASOPHILS NFR BLD AUTO: 0.4 % — SIGNIFICANT CHANGE UP (ref 0–2)
BUN SERPL-MCNC: 14 MG/DL — SIGNIFICANT CHANGE UP (ref 8–20)
CALCIUM SERPL-MCNC: 8.3 MG/DL — LOW (ref 8.6–10.2)
CHLORIDE SERPL-SCNC: 94 MMOL/L — LOW (ref 98–107)
CO2 SERPL-SCNC: 27 MMOL/L — SIGNIFICANT CHANGE UP (ref 22–29)
COVID-19 SPIKE DOMAIN AB INTERP: POSITIVE
COVID-19 SPIKE DOMAIN ANTIBODY RESULT: >250 U/ML — HIGH
CREAT SERPL-MCNC: 0.78 MG/DL — SIGNIFICANT CHANGE UP (ref 0.5–1.3)
EOSINOPHIL # BLD AUTO: 0.1 K/UL — SIGNIFICANT CHANGE UP (ref 0–0.5)
EOSINOPHIL NFR BLD AUTO: 1.3 % — SIGNIFICANT CHANGE UP (ref 0–6)
GLUCOSE SERPL-MCNC: 126 MG/DL — HIGH (ref 70–99)
HCT VFR BLD CALC: 33.5 % — LOW (ref 39–50)
HGB BLD-MCNC: 11.5 G/DL — LOW (ref 13–17)
IMM GRANULOCYTES NFR BLD AUTO: 0.3 % — SIGNIFICANT CHANGE UP (ref 0–1.5)
LYMPHOCYTES # BLD AUTO: 0.54 K/UL — LOW (ref 1–3.3)
LYMPHOCYTES # BLD AUTO: 6.9 % — LOW (ref 13–44)
MAGNESIUM SERPL-MCNC: 2 MG/DL — SIGNIFICANT CHANGE UP (ref 1.6–2.6)
MCHC RBC-ENTMCNC: 30.8 PG — SIGNIFICANT CHANGE UP (ref 27–34)
MCHC RBC-ENTMCNC: 34.3 GM/DL — SIGNIFICANT CHANGE UP (ref 32–36)
MCV RBC AUTO: 89.8 FL — SIGNIFICANT CHANGE UP (ref 80–100)
MONOCYTES # BLD AUTO: 0.59 K/UL — SIGNIFICANT CHANGE UP (ref 0–0.9)
MONOCYTES NFR BLD AUTO: 7.5 % — SIGNIFICANT CHANGE UP (ref 2–14)
NEUTROPHILS # BLD AUTO: 6.54 K/UL — SIGNIFICANT CHANGE UP (ref 1.8–7.4)
NEUTROPHILS NFR BLD AUTO: 83.6 % — HIGH (ref 43–77)
PHOSPHATE SERPL-MCNC: 2.9 MG/DL — SIGNIFICANT CHANGE UP (ref 2.4–4.7)
PLATELET # BLD AUTO: 184 K/UL — SIGNIFICANT CHANGE UP (ref 150–400)
POTASSIUM SERPL-MCNC: 3.8 MMOL/L — SIGNIFICANT CHANGE UP (ref 3.5–5.3)
POTASSIUM SERPL-SCNC: 3.8 MMOL/L — SIGNIFICANT CHANGE UP (ref 3.5–5.3)
RBC # BLD: 3.73 M/UL — LOW (ref 4.2–5.8)
RBC # FLD: 12.7 % — SIGNIFICANT CHANGE UP (ref 10.3–14.5)
SARS-COV-2 IGG+IGM SERPL QL IA: >250 U/ML — HIGH
SARS-COV-2 IGG+IGM SERPL QL IA: POSITIVE
SODIUM SERPL-SCNC: 130 MMOL/L — LOW (ref 135–145)
WBC # BLD: 7.82 K/UL — SIGNIFICANT CHANGE UP (ref 3.8–10.5)
WBC # FLD AUTO: 7.82 K/UL — SIGNIFICANT CHANGE UP (ref 3.8–10.5)

## 2021-03-21 PROCEDURE — 99232 SBSQ HOSP IP/OBS MODERATE 35: CPT

## 2021-03-21 PROCEDURE — 71045 X-RAY EXAM CHEST 1 VIEW: CPT | Mod: 26

## 2021-03-21 RX ORDER — POLYETHYLENE GLYCOL 3350 17 G/17G
17 POWDER, FOR SOLUTION ORAL DAILY
Refills: 0 | Status: DISCONTINUED | OUTPATIENT
Start: 2021-03-21 | End: 2021-03-23

## 2021-03-21 RX ORDER — POTASSIUM PHOSPHATE, MONOBASIC POTASSIUM PHOSPHATE, DIBASIC 236; 224 MG/ML; MG/ML
15 INJECTION, SOLUTION INTRAVENOUS ONCE
Refills: 0 | Status: COMPLETED | OUTPATIENT
Start: 2021-03-21 | End: 2021-03-21

## 2021-03-21 RX ADMIN — HYDROMORPHONE HYDROCHLORIDE 0.5 MILLIGRAM(S): 2 INJECTION INTRAMUSCULAR; INTRAVENOUS; SUBCUTANEOUS at 19:58

## 2021-03-21 RX ADMIN — Medication 600 MILLIGRAM(S): at 17:18

## 2021-03-21 RX ADMIN — OXYCODONE HYDROCHLORIDE 10 MILLIGRAM(S): 5 TABLET ORAL at 19:04

## 2021-03-21 RX ADMIN — Medication 600 MILLIGRAM(S): at 06:13

## 2021-03-21 RX ADMIN — Medication 600 MILLIGRAM(S): at 01:45

## 2021-03-21 RX ADMIN — OXYCODONE HYDROCHLORIDE 10 MILLIGRAM(S): 5 TABLET ORAL at 14:42

## 2021-03-21 RX ADMIN — Medication 600 MILLIGRAM(S): at 12:11

## 2021-03-21 RX ADMIN — Medication 650 MILLIGRAM(S): at 06:30

## 2021-03-21 RX ADMIN — OXYCODONE HYDROCHLORIDE 10 MILLIGRAM(S): 5 TABLET ORAL at 08:18

## 2021-03-21 RX ADMIN — HYDROMORPHONE HYDROCHLORIDE 0.5 MILLIGRAM(S): 2 INJECTION INTRAMUSCULAR; INTRAVENOUS; SUBCUTANEOUS at 08:44

## 2021-03-21 RX ADMIN — OXYCODONE HYDROCHLORIDE 10 MILLIGRAM(S): 5 TABLET ORAL at 19:58

## 2021-03-21 RX ADMIN — SENNA PLUS 2 TABLET(S): 8.6 TABLET ORAL at 23:23

## 2021-03-21 RX ADMIN — OXYCODONE HYDROCHLORIDE 10 MILLIGRAM(S): 5 TABLET ORAL at 04:30

## 2021-03-21 RX ADMIN — OXYCODONE HYDROCHLORIDE 10 MILLIGRAM(S): 5 TABLET ORAL at 08:44

## 2021-03-21 RX ADMIN — OXYCODONE HYDROCHLORIDE 10 MILLIGRAM(S): 5 TABLET ORAL at 23:23

## 2021-03-21 RX ADMIN — CHLORHEXIDINE GLUCONATE 1 APPLICATION(S): 213 SOLUTION TOPICAL at 12:14

## 2021-03-21 RX ADMIN — LIDOCAINE 1 PATCH: 4 CREAM TOPICAL at 02:45

## 2021-03-21 RX ADMIN — Medication 650 MILLIGRAM(S): at 17:17

## 2021-03-21 RX ADMIN — OXYCODONE HYDROCHLORIDE 10 MILLIGRAM(S): 5 TABLET ORAL at 13:40

## 2021-03-21 RX ADMIN — Medication 650 MILLIGRAM(S): at 12:11

## 2021-03-21 RX ADMIN — Medication 650 MILLIGRAM(S): at 18:00

## 2021-03-21 RX ADMIN — Medication 650 MILLIGRAM(S): at 13:00

## 2021-03-21 RX ADMIN — Medication 600 MILLIGRAM(S): at 06:40

## 2021-03-21 RX ADMIN — Medication 650 MILLIGRAM(S): at 01:45

## 2021-03-21 RX ADMIN — Medication 600 MILLIGRAM(S): at 02:15

## 2021-03-21 RX ADMIN — OXYCODONE HYDROCHLORIDE 10 MILLIGRAM(S): 5 TABLET ORAL at 04:18

## 2021-03-21 RX ADMIN — POTASSIUM PHOSPHATE, MONOBASIC POTASSIUM PHOSPHATE, DIBASIC 62.5 MILLIMOLE(S): 236; 224 INJECTION, SOLUTION INTRAVENOUS at 06:05

## 2021-03-21 RX ADMIN — HYDROMORPHONE HYDROCHLORIDE 0.5 MILLIGRAM(S): 2 INJECTION INTRAMUSCULAR; INTRAVENOUS; SUBCUTANEOUS at 20:28

## 2021-03-21 RX ADMIN — HYDROMORPHONE HYDROCHLORIDE 0.5 MILLIGRAM(S): 2 INJECTION INTRAMUSCULAR; INTRAVENOUS; SUBCUTANEOUS at 09:15

## 2021-03-21 RX ADMIN — Medication 600 MILLIGRAM(S): at 18:00

## 2021-03-21 RX ADMIN — Medication 600 MILLIGRAM(S): at 13:00

## 2021-03-21 RX ADMIN — Medication 650 MILLIGRAM(S): at 02:15

## 2021-03-21 RX ADMIN — ENOXAPARIN SODIUM 40 MILLIGRAM(S): 100 INJECTION SUBCUTANEOUS at 12:11

## 2021-03-21 RX ADMIN — Medication 650 MILLIGRAM(S): at 06:05

## 2021-03-21 RX ADMIN — POLYETHYLENE GLYCOL 3350 17 GRAM(S): 17 POWDER, FOR SOLUTION ORAL at 12:12

## 2021-03-21 NOTE — PROGRESS NOTE ADULT - SUBJECTIVE AND OBJECTIVE BOX
INTERVAL HPI/OVERNIGHT EVENTS/SUBJECTIVE: Pt did well overnight.  No Air leak in chest tube.  Pain well controlled. Denies SOB, CP, Abd pain HA or other CO.    ICU Vital Signs Last 24 Hrs  T(C): 37 (21 Mar 2021 00:29), Max: 37 (21 Mar 2021 00:29)  T(F): 98.6 (21 Mar 2021 00:29), Max: 98.6 (21 Mar 2021 00:29)  HR: 65 (21 Mar 2021 00:00) (64 - 91)  BP: 153/103 (21 Mar 2021 00:00) (127/92 - 199/118)  BP(mean): 116 (21 Mar 2021 00:00) (93 - 130)  ABP: --  ABP(mean): --  RR: 21 (21 Mar 2021 00:00) (13 - 28)  SpO2: 96% (21 Mar 2021 00:00) (92% - 100%)      I&O's Detail    20 Mar 2021 07:01  -  21 Mar 2021 02:21  --------------------------------------------------------  IN:    Oral Fluid: 600 mL  Total IN: 600 mL    OUT:    Chest Tube (mL): 20 mL    Voided (mL): 400 mL  Total OUT: 420 mL    Total NET: 180 mL            ABG - ( 20 Mar 2021 14:02 )  pH, Arterial: 7.33  pH, Blood: x     /  pCO2: 50    /  pO2: 86    / HCO3: 24    / Base Excess: 0.1   /  SaO2: 97                  MEDICATIONS  (STANDING):  acetaminophen   Tablet .. 650 milliGRAM(s) Oral every 6 hours  chlorhexidine 2% Cloths 1 Application(s) Topical daily  enoxaparin Injectable 40 milliGRAM(s) SubCutaneous daily  ibuprofen  Tablet. 600 milliGRAM(s) Oral every 6 hours  influenza   Vaccine 0.5 milliLiter(s) IntraMuscular once  lidocaine   Patch 1 Patch Transdermal every 24 hours  senna 2 Tablet(s) Oral at bedtime    MEDICATIONS  (PRN):  HYDROmorphone  Injectable 0.5 milliGRAM(s) IV Push every 3 hours PRN breakthrough pain  oxyCODONE    IR 5 milliGRAM(s) Oral every 4 hours PRN Moderate Pain (4 - 6)  oxyCODONE    IR 10 milliGRAM(s) Oral every 4 hours PRN Severe Pain (7 - 10)      CHEST TUBE:  LOCATION:  DATE INSERTED: OUTPUT/24 HRS:  SUCTION/WATER SEAL:     PHYSICAL EXAM:     Gen: NAD, Well appearing, No cyanosis, Pallor.    Eyes: PERRL ~ 3mm, EOMI,     Neurological: A&Ox3, GCS 15, No focal deficit.     ENMT: Clear canals, clear throat.      Neck: Supple. NT AT, FROM no pain.  No JVD. No meningeal signs    Pulmonary: NAD, CTA, = BL .  Extensive Sub Q air to chest wall, neck and face.    Cardiovascular: RRR, S1, S2, No Murmurs, rubs or gallops noted.    Gastrointestinal :ND, Soft, NT.    Extremities: NT, AT, no edema, erythema or palpable cord noted.  FROM, = 2+ pulses throughout.    LABS:  CBC Full  -  ( 20 Mar 2021 11:20 )  WBC Count : 8.95 K/uL  RBC Count : 4.58 M/uL  Hemoglobin : 14.0 g/dL  Hematocrit : 41.0 %  Platelet Count - Automated : 254 K/uL  Mean Cell Volume : 89.5 fl  Mean Cell Hemoglobin : 30.6 pg  Mean Cell Hemoglobin Concentration : 34.1 gm/dL  Auto Neutrophil # : 7.16 K/uL  Auto Lymphocyte # : 0.55 K/uL  Auto Monocyte # : 0.93 K/uL  Auto Eosinophil # : 0.00 K/uL  Auto Basophil # : 0.00 K/uL  Auto Neutrophil % : 76.5 %  Auto Lymphocyte % : 6.1 %  Auto Monocyte % : 10.4 %  Auto Eosinophil % : 0.0 %  Auto Basophil % : 0.0 %    03-20    135  |  99  |  11.0  ----------------------------<  130<H>  4.1   |  25.0  |  0.76    Ca    8.6      20 Mar 2021 11:20    TPro  7.1  /  Alb  4.4  /  TBili  0.9  /  DBili  x   /  AST  33  /  ALT  23  /  AlkPhos  84  03-20    PT/INR - ( 20 Mar 2021 11:20 )   PT: 12.5 sec;   INR: 1.08 ratio         PTT - ( 20 Mar 2021 11:20 )  PTT:29.2 sec    RECENT CULTURES:      LIVER FUNCTIONS - ( 20 Mar 2021 11:20 )  Alb: 4.4 g/dL / Pro: 7.1 g/dL / ALK PHOS: 84 U/L / ALT: 23 U/L / AST: 33 U/L / GGT: x           CARDIAC MARKERS ( 20 Mar 2021 11:20 )  x     / <0.01 ng/mL / x     / x     / x          CAPILLARY BLOOD GLUCOSE      RADIOLOGY & ADDITIONAL STUDIES:    ASSESSMENT/PLAN:  55yMale presenting with: R 4-9 ribs fractures, BL PTX, R hemothorax, sub Q air.     Neurological: Multimodal analgesics     Neck: Monitor airway.     Pulmonary: Maintain Chest tube.  FU CXR today. Will place on water seal if good.    Cardiovascular: Would allow HTN to 180s.  May need Antihypertensives prior to DC and hospitalist FU for HTN.    Gastrointestinal: Regular diet.  IVL    Genitourinary: Voids freely    Heme: Lovenox.     ID: None    Lines/ Tubes: As above.     Dispo: Pt stable for downgrade to Floors pulse ox.      INTERVAL HPI/OVERNIGHT EVENTS/SUBJECTIVE: Pt did well overnight.  No Air leak in chest tube.  Pain well controlled. Denies SOB, CP, Abd pain HA or other CO.    ICU Vital Signs Last 24 Hrs  T(C): 37 (21 Mar 2021 00:29), Max: 37 (21 Mar 2021 00:29)  T(F): 98.6 (21 Mar 2021 00:29), Max: 98.6 (21 Mar 2021 00:29)  HR: 65 (21 Mar 2021 00:00) (64 - 91)  BP: 153/103 (21 Mar 2021 00:00) (127/92 - 199/118)  BP(mean): 116 (21 Mar 2021 00:00) (93 - 130)  ABP: --  ABP(mean): --  RR: 21 (21 Mar 2021 00:00) (13 - 28)  SpO2: 96% (21 Mar 2021 00:00) (92% - 100%)      I&O's Detail    20 Mar 2021 07:01  -  21 Mar 2021 02:21  --------------------------------------------------------  IN:    Oral Fluid: 600 mL  Total IN: 600 mL    OUT:    Chest Tube (mL): 20 mL    Voided (mL): 400 mL  Total OUT: 420 mL    Total NET: 180 mL            ABG - ( 20 Mar 2021 14:02 )  pH, Arterial: 7.33  pH, Blood: x     /  pCO2: 50    /  pO2: 86    / HCO3: 24    / Base Excess: 0.1   /  SaO2: 97                  MEDICATIONS  (STANDING):  acetaminophen   Tablet .. 650 milliGRAM(s) Oral every 6 hours  chlorhexidine 2% Cloths 1 Application(s) Topical daily  enoxaparin Injectable 40 milliGRAM(s) SubCutaneous daily  ibuprofen  Tablet. 600 milliGRAM(s) Oral every 6 hours  influenza   Vaccine 0.5 milliLiter(s) IntraMuscular once  lidocaine   Patch 1 Patch Transdermal every 24 hours  senna 2 Tablet(s) Oral at bedtime    MEDICATIONS  (PRN):  HYDROmorphone  Injectable 0.5 milliGRAM(s) IV Push every 3 hours PRN breakthrough pain  oxyCODONE    IR 5 milliGRAM(s) Oral every 4 hours PRN Moderate Pain (4 - 6)  oxyCODONE    IR 10 milliGRAM(s) Oral every 4 hours PRN Severe Pain (7 - 10)      CHEST TUBE:  LOCATION:  DATE INSERTED: OUTPUT/24 HRS:  SUCTION/WATER SEAL:     PHYSICAL EXAM:     Gen: NAD, Well appearing, No cyanosis, Pallor.    Eyes: PERRL ~ 3mm, EOMI,     Neurological: A&Ox3, GCS 15, No focal deficit.     ENMT: Clear canals, clear throat.      Neck: Supple. NT AT, FROM no pain.  No JVD. No meningeal signs    Pulmonary: NAD, CTA, = BL .  Extensive Sub Q air to chest wall, neck and face.    Cardiovascular: RRR, S1, S2, No Murmurs, rubs or gallops noted.    Gastrointestinal :ND, Soft, NT.    Extremities: NT, AT, no edema, erythema or palpable cord noted.  FROM, = 2+ pulses throughout.    LABS:  CBC Full  -  ( 20 Mar 2021 11:20 )  WBC Count : 8.95 K/uL  RBC Count : 4.58 M/uL  Hemoglobin : 14.0 g/dL  Hematocrit : 41.0 %  Platelet Count - Automated : 254 K/uL  Mean Cell Volume : 89.5 fl  Mean Cell Hemoglobin : 30.6 pg  Mean Cell Hemoglobin Concentration : 34.1 gm/dL  Auto Neutrophil # : 7.16 K/uL  Auto Lymphocyte # : 0.55 K/uL  Auto Monocyte # : 0.93 K/uL  Auto Eosinophil # : 0.00 K/uL  Auto Basophil # : 0.00 K/uL  Auto Neutrophil % : 76.5 %  Auto Lymphocyte % : 6.1 %  Auto Monocyte % : 10.4 %  Auto Eosinophil % : 0.0 %  Auto Basophil % : 0.0 %    03-20    135  |  99  |  11.0  ----------------------------<  130<H>  4.1   |  25.0  |  0.76    Ca    8.6      20 Mar 2021 11:20    TPro  7.1  /  Alb  4.4  /  TBili  0.9  /  DBili  x   /  AST  33  /  ALT  23  /  AlkPhos  84  03-20    PT/INR - ( 20 Mar 2021 11:20 )   PT: 12.5 sec;   INR: 1.08 ratio         PTT - ( 20 Mar 2021 11:20 )  PTT:29.2 sec    RECENT CULTURES:      LIVER FUNCTIONS - ( 20 Mar 2021 11:20 )  Alb: 4.4 g/dL / Pro: 7.1 g/dL / ALK PHOS: 84 U/L / ALT: 23 U/L / AST: 33 U/L / GGT: x           CARDIAC MARKERS ( 20 Mar 2021 11:20 )  x     / <0.01 ng/mL / x     / x     / x          CAPILLARY BLOOD GLUCOSE      RADIOLOGY & ADDITIONAL STUDIES:    ASSESSMENT/PLAN:  55yMale presenting with: R 4-9 ribs fractures, BL PTX, R hemothorax, sub Q air.     Neurological: Multimodal analgesics     Neck: Monitor airway.     Pulmonary: Maintain Chest tube.  FU CXR today. Will place on water seal if good.  PIC score 7 this am.  Able to mobilize to chair with minimal pain    Cardiovascular: Would allow HTN to 180s.  May need Antihypertensives prior to DC and hospitalist FU for HTN.    Gastrointestinal: Regular diet.  IVL    Genitourinary: Voids freely    Heme: Lovenox.     ID: None    Lines/ Tubes: As above.     Dispo: Pt stable for downgrade to Floors pulse ox.

## 2021-03-22 ENCOUNTER — TRANSCRIPTION ENCOUNTER (OUTPATIENT)
Age: 56
End: 2021-03-22

## 2021-03-22 DIAGNOSIS — J93.9 PNEUMOTHORAX, UNSPECIFIED: ICD-10-CM

## 2021-03-22 DIAGNOSIS — R07.81 PLEURODYNIA: ICD-10-CM

## 2021-03-22 DIAGNOSIS — S22.49XA MULTIPLE FRACTURES OF RIBS, UNSPECIFIED SIDE, INITIAL ENCOUNTER FOR CLOSED FRACTURE: ICD-10-CM

## 2021-03-22 LAB
ANION GAP SERPL CALC-SCNC: 9 MMOL/L — SIGNIFICANT CHANGE UP (ref 5–17)
ANION GAP SERPL CALC-SCNC: 9 MMOL/L — SIGNIFICANT CHANGE UP (ref 5–17)
ANISOCYTOSIS BLD QL: SLIGHT — SIGNIFICANT CHANGE UP
BASOPHILS # BLD AUTO: 0 K/UL — SIGNIFICANT CHANGE UP (ref 0–0.2)
BASOPHILS NFR BLD AUTO: 0 % — SIGNIFICANT CHANGE UP (ref 0–2)
BUN SERPL-MCNC: 16 MG/DL — SIGNIFICANT CHANGE UP (ref 8–20)
BUN SERPL-MCNC: 17 MG/DL — SIGNIFICANT CHANGE UP (ref 8–20)
BURR CELLS BLD QL SMEAR: PRESENT — SIGNIFICANT CHANGE UP
CALCIUM SERPL-MCNC: 8.3 MG/DL — LOW (ref 8.6–10.2)
CALCIUM SERPL-MCNC: 9 MG/DL — SIGNIFICANT CHANGE UP (ref 8.6–10.2)
CHLORIDE SERPL-SCNC: 92 MMOL/L — LOW (ref 98–107)
CHLORIDE SERPL-SCNC: 96 MMOL/L — LOW (ref 98–107)
CHLORIDE UR-SCNC: <27 MMOL/L — SIGNIFICANT CHANGE UP
CO2 SERPL-SCNC: 26 MMOL/L — SIGNIFICANT CHANGE UP (ref 22–29)
CO2 SERPL-SCNC: 28 MMOL/L — SIGNIFICANT CHANGE UP (ref 22–29)
CREAT ?TM UR-MCNC: 68 MG/DL — SIGNIFICANT CHANGE UP
CREAT SERPL-MCNC: 0.77 MG/DL — SIGNIFICANT CHANGE UP (ref 0.5–1.3)
CREAT SERPL-MCNC: 0.81 MG/DL — SIGNIFICANT CHANGE UP (ref 0.5–1.3)
EOSINOPHIL # BLD AUTO: 0.16 K/UL — SIGNIFICANT CHANGE UP (ref 0–0.5)
EOSINOPHIL NFR BLD AUTO: 2.7 % — SIGNIFICANT CHANGE UP (ref 0–6)
GLUCOSE SERPL-MCNC: 119 MG/DL — HIGH (ref 70–99)
GLUCOSE SERPL-MCNC: 148 MG/DL — HIGH (ref 70–99)
HCT VFR BLD CALC: 30.3 % — LOW (ref 39–50)
HCV AB S/CO SERPL IA: 0.06 S/CO — SIGNIFICANT CHANGE UP (ref 0–0.99)
HCV AB SERPL-IMP: SIGNIFICANT CHANGE UP
HGB BLD-MCNC: 10.3 G/DL — LOW (ref 13–17)
HYPOCHROMIA BLD QL: SLIGHT — SIGNIFICANT CHANGE UP
LYMPHOCYTES # BLD AUTO: 0.54 K/UL — LOW (ref 1–3.3)
LYMPHOCYTES # BLD AUTO: 8.8 % — LOW (ref 13–44)
MACROCYTES BLD QL: SLIGHT — SIGNIFICANT CHANGE UP
MAGNESIUM SERPL-MCNC: 2.2 MG/DL — SIGNIFICANT CHANGE UP (ref 1.8–2.6)
MANUAL SMEAR VERIFICATION: SIGNIFICANT CHANGE UP
MCHC RBC-ENTMCNC: 30.5 PG — SIGNIFICANT CHANGE UP (ref 27–34)
MCHC RBC-ENTMCNC: 34 GM/DL — SIGNIFICANT CHANGE UP (ref 32–36)
MCV RBC AUTO: 89.6 FL — SIGNIFICANT CHANGE UP (ref 80–100)
MICROCYTES BLD QL: SLIGHT — SIGNIFICANT CHANGE UP
MONOCYTES # BLD AUTO: 0.21 K/UL — SIGNIFICANT CHANGE UP (ref 0–0.9)
MONOCYTES NFR BLD AUTO: 3.5 % — SIGNIFICANT CHANGE UP (ref 2–14)
NEUTROPHILS # BLD AUTO: 5.12 K/UL — SIGNIFICANT CHANGE UP (ref 1.8–7.4)
NEUTROPHILS NFR BLD AUTO: 83.2 % — HIGH (ref 43–77)
NEUTS BAND # BLD: 0.9 % — SIGNIFICANT CHANGE UP (ref 0–8)
OSMOLALITY SERPL: 271 MOSMOL/KG — LOW (ref 275–300)
OSMOLALITY SERPL: 292 MOSMOL/KG — SIGNIFICANT CHANGE UP (ref 275–300)
OSMOLALITY UR: 243 MOSM/KG — LOW (ref 300–1000)
OVALOCYTES BLD QL SMEAR: SLIGHT — SIGNIFICANT CHANGE UP
PHOSPHATE SERPL-MCNC: 2.9 MG/DL — SIGNIFICANT CHANGE UP (ref 2.4–4.7)
PLAT MORPH BLD: NORMAL — SIGNIFICANT CHANGE UP
PLATELET # BLD AUTO: 181 K/UL — SIGNIFICANT CHANGE UP (ref 150–400)
POIKILOCYTOSIS BLD QL AUTO: SLIGHT — SIGNIFICANT CHANGE UP
POLYCHROMASIA BLD QL SMEAR: SLIGHT — SIGNIFICANT CHANGE UP
POTASSIUM SERPL-MCNC: 3.6 MMOL/L — SIGNIFICANT CHANGE UP (ref 3.5–5.3)
POTASSIUM SERPL-MCNC: 4.3 MMOL/L — SIGNIFICANT CHANGE UP (ref 3.5–5.3)
POTASSIUM SERPL-SCNC: 3.6 MMOL/L — SIGNIFICANT CHANGE UP (ref 3.5–5.3)
POTASSIUM SERPL-SCNC: 4.3 MMOL/L — SIGNIFICANT CHANGE UP (ref 3.5–5.3)
RBC # BLD: 3.38 M/UL — LOW (ref 4.2–5.8)
RBC # FLD: 12.5 % — SIGNIFICANT CHANGE UP (ref 10.3–14.5)
RBC BLD AUTO: ABNORMAL
SODIUM SERPL-SCNC: 127 MMOL/L — LOW (ref 135–145)
SODIUM SERPL-SCNC: 133 MMOL/L — LOW (ref 135–145)
SODIUM UR-SCNC: <30 MMOL/L — SIGNIFICANT CHANGE UP
VARIANT LYMPHS # BLD: 0.9 % — SIGNIFICANT CHANGE UP (ref 0–6)
WBC # BLD: 6.09 K/UL — SIGNIFICANT CHANGE UP (ref 3.8–10.5)
WBC # FLD AUTO: 6.09 K/UL — SIGNIFICANT CHANGE UP (ref 3.8–10.5)

## 2021-03-22 PROCEDURE — 71045 X-RAY EXAM CHEST 1 VIEW: CPT | Mod: 26

## 2021-03-22 PROCEDURE — 99233 SBSQ HOSP IP/OBS HIGH 50: CPT | Mod: 57

## 2021-03-22 PROCEDURE — 99232 SBSQ HOSP IP/OBS MODERATE 35: CPT

## 2021-03-22 RX ORDER — SODIUM,POTASSIUM PHOSPHATES 278-250MG
1 POWDER IN PACKET (EA) ORAL EVERY 4 HOURS
Refills: 0 | Status: COMPLETED | OUTPATIENT
Start: 2021-03-22 | End: 2021-03-22

## 2021-03-22 RX ORDER — SODIUM CHLORIDE 9 MG/ML
1000 INJECTION INTRAMUSCULAR; INTRAVENOUS; SUBCUTANEOUS
Refills: 0 | Status: DISCONTINUED | OUTPATIENT
Start: 2021-03-22 | End: 2021-03-23

## 2021-03-22 RX ORDER — SODIUM CHLORIDE 9 MG/ML
1000 INJECTION INTRAMUSCULAR; INTRAVENOUS; SUBCUTANEOUS ONCE
Refills: 0 | Status: COMPLETED | OUTPATIENT
Start: 2021-03-22 | End: 2021-03-22

## 2021-03-22 RX ORDER — SODIUM CHLORIDE 9 MG/ML
1000 INJECTION INTRAMUSCULAR; INTRAVENOUS; SUBCUTANEOUS
Refills: 0 | Status: DISCONTINUED | OUTPATIENT
Start: 2021-03-22 | End: 2021-03-22

## 2021-03-22 RX ADMIN — SODIUM CHLORIDE 2000 MILLILITER(S): 9 INJECTION INTRAMUSCULAR; INTRAVENOUS; SUBCUTANEOUS at 10:30

## 2021-03-22 RX ADMIN — Medication 650 MILLIGRAM(S): at 23:45

## 2021-03-22 RX ADMIN — OXYCODONE HYDROCHLORIDE 10 MILLIGRAM(S): 5 TABLET ORAL at 16:18

## 2021-03-22 RX ADMIN — Medication 600 MILLIGRAM(S): at 11:18

## 2021-03-22 RX ADMIN — HYDROMORPHONE HYDROCHLORIDE 0.5 MILLIGRAM(S): 2 INJECTION INTRAMUSCULAR; INTRAVENOUS; SUBCUTANEOUS at 19:50

## 2021-03-22 RX ADMIN — OXYCODONE HYDROCHLORIDE 10 MILLIGRAM(S): 5 TABLET ORAL at 09:15

## 2021-03-22 RX ADMIN — OXYCODONE HYDROCHLORIDE 10 MILLIGRAM(S): 5 TABLET ORAL at 20:34

## 2021-03-22 RX ADMIN — POLYETHYLENE GLYCOL 3350 17 GRAM(S): 17 POWDER, FOR SOLUTION ORAL at 11:00

## 2021-03-22 RX ADMIN — OXYCODONE HYDROCHLORIDE 10 MILLIGRAM(S): 5 TABLET ORAL at 08:47

## 2021-03-22 RX ADMIN — Medication 650 MILLIGRAM(S): at 00:00

## 2021-03-22 RX ADMIN — Medication 650 MILLIGRAM(S): at 17:56

## 2021-03-22 RX ADMIN — HYDROMORPHONE HYDROCHLORIDE 0.5 MILLIGRAM(S): 2 INJECTION INTRAMUSCULAR; INTRAVENOUS; SUBCUTANEOUS at 01:32

## 2021-03-22 RX ADMIN — Medication 600 MILLIGRAM(S): at 07:00

## 2021-03-22 RX ADMIN — HYDROMORPHONE HYDROCHLORIDE 0.5 MILLIGRAM(S): 2 INJECTION INTRAMUSCULAR; INTRAVENOUS; SUBCUTANEOUS at 02:00

## 2021-03-22 RX ADMIN — Medication 650 MILLIGRAM(S): at 11:00

## 2021-03-22 RX ADMIN — Medication 600 MILLIGRAM(S): at 00:21

## 2021-03-22 RX ADMIN — Medication 600 MILLIGRAM(S): at 23:09

## 2021-03-22 RX ADMIN — Medication 600 MILLIGRAM(S): at 06:01

## 2021-03-22 RX ADMIN — Medication 600 MILLIGRAM(S): at 00:00

## 2021-03-22 RX ADMIN — Medication 650 MILLIGRAM(S): at 06:01

## 2021-03-22 RX ADMIN — Medication 600 MILLIGRAM(S): at 23:45

## 2021-03-22 RX ADMIN — HYDROMORPHONE HYDROCHLORIDE 0.5 MILLIGRAM(S): 2 INJECTION INTRAMUSCULAR; INTRAVENOUS; SUBCUTANEOUS at 23:09

## 2021-03-22 RX ADMIN — Medication 600 MILLIGRAM(S): at 18:20

## 2021-03-22 RX ADMIN — OXYCODONE HYDROCHLORIDE 10 MILLIGRAM(S): 5 TABLET ORAL at 17:00

## 2021-03-22 RX ADMIN — ENOXAPARIN SODIUM 40 MILLIGRAM(S): 100 INJECTION SUBCUTANEOUS at 11:00

## 2021-03-22 RX ADMIN — Medication 650 MILLIGRAM(S): at 23:09

## 2021-03-22 RX ADMIN — Medication 650 MILLIGRAM(S): at 18:20

## 2021-03-22 RX ADMIN — CHLORHEXIDINE GLUCONATE 1 APPLICATION(S): 213 SOLUTION TOPICAL at 11:01

## 2021-03-22 RX ADMIN — Medication 1 PACKET(S): at 13:10

## 2021-03-22 RX ADMIN — OXYCODONE HYDROCHLORIDE 10 MILLIGRAM(S): 5 TABLET ORAL at 00:21

## 2021-03-22 RX ADMIN — SENNA PLUS 2 TABLET(S): 8.6 TABLET ORAL at 20:33

## 2021-03-22 RX ADMIN — Medication 650 MILLIGRAM(S): at 07:00

## 2021-03-22 RX ADMIN — Medication 650 MILLIGRAM(S): at 00:21

## 2021-03-22 RX ADMIN — Medication 600 MILLIGRAM(S): at 11:01

## 2021-03-22 RX ADMIN — Medication 600 MILLIGRAM(S): at 17:56

## 2021-03-22 RX ADMIN — HYDROMORPHONE HYDROCHLORIDE 0.5 MILLIGRAM(S): 2 INJECTION INTRAMUSCULAR; INTRAVENOUS; SUBCUTANEOUS at 23:25

## 2021-03-22 RX ADMIN — Medication 1 PACKET(S): at 11:00

## 2021-03-22 RX ADMIN — OXYCODONE HYDROCHLORIDE 10 MILLIGRAM(S): 5 TABLET ORAL at 21:10

## 2021-03-22 RX ADMIN — Medication 650 MILLIGRAM(S): at 11:18

## 2021-03-22 RX ADMIN — HYDROMORPHONE HYDROCHLORIDE 0.5 MILLIGRAM(S): 2 INJECTION INTRAMUSCULAR; INTRAVENOUS; SUBCUTANEOUS at 19:34
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
## 2021-03-22 NOTE — PROGRESS NOTE ADULT - SUBJECTIVE AND OBJECTIVE BOX
INTERVAL HPI/OVERNIGHT EVENTS/SUBJECTIVE: Pt still CO severe R sided rib pain with movement and deep breathing.  IS >2,000. Strong Cough.  Denies SOB, Ab pain, Numbness, weakness or other CO.  CXR showed worsened R sided Hemothorax.  Chest tube putting out well though.     ICU Vital Signs Last 24 Hrs  T(C): 36.6 (22 Mar 2021 00:20), Max: 37 (21 Mar 2021 04:38)  T(F): 97.8 (22 Mar 2021 00:20), Max: 98.6 (21 Mar 2021 04:38)  HR: 73 (22 Mar 2021 02:00) (69 - 101)  BP: 129/86 (22 Mar 2021 02:00) (114/101 - 149/92)  BP(mean): 100 (22 Mar 2021 02:00) (91 - 120)  ABP: --  ABP(mean): --  RR: 14 (22 Mar 2021 02:00) (12 - 27)  SpO2: 94% (22 Mar 2021 02:00) (79% - 100%)      I&O's Detail    20 Mar 2021 07:01  -  21 Mar 2021 07:00  --------------------------------------------------------  IN:    IV PiggyBack: 625 mL    Oral Fluid: 1400 mL  Total IN: 2025 mL    OUT:    Chest Tube (mL): 390 mL    Voided (mL): 800 mL  Total OUT: 1190 mL    Total NET: 835 mL      21 Mar 2021 07:01  -  22 Mar 2021 02:37  --------------------------------------------------------  IN:    IV PiggyBack: 187.5 mL    Oral Fluid: 1600 mL  Total IN: 1787.5 mL    OUT:    Chest Tube (mL): 550 mL    Voided (mL): 1600 mL  Total OUT: 2150 mL    Total NET: -362.5 mL            ABG - ( 20 Mar 2021 14:02 )  pH, Arterial: 7.33  pH, Blood: x     /  pCO2: 50    /  pO2: 86    / HCO3: 24    / Base Excess: 0.1   /  SaO2: 97                  MEDICATIONS  (STANDING):  acetaminophen   Tablet .. 650 milliGRAM(s) Oral every 6 hours  chlorhexidine 2% Cloths 1 Application(s) Topical daily  enoxaparin Injectable 40 milliGRAM(s) SubCutaneous daily  ibuprofen  Tablet. 600 milliGRAM(s) Oral every 6 hours  influenza   Vaccine 0.5 milliLiter(s) IntraMuscular once  lidocaine   Patch 1 Patch Transdermal every 24 hours  polyethylene glycol 3350 17 Gram(s) Oral daily  senna 2 Tablet(s) Oral at bedtime    MEDICATIONS  (PRN):  HYDROmorphone  Injectable 0.5 milliGRAM(s) IV Push every 3 hours PRN breakthrough pain  oxyCODONE    IR 5 milliGRAM(s) Oral every 4 hours PRN Moderate Pain (4 - 6)  oxyCODONE    IR 10 milliGRAM(s) Oral every 4 hours PRN Severe Pain (7 - 10)      NUTRITION/IVF: Reg diet / IVL    PHYSICAL EXAM:     Gen: NAD, Well appearing, No cyanosis, Pallor.    Eyes: PERRL ~ 3mm, EOMI,     Neurological: A&Ox3, GCS 15, No focal deficit.     ENMT: Clear canals, clear throat.      Neck: Supple. NT AT, FROM no pain.  No JVD. No meningeal signs    Pulmonary: NAD, CTA, = BL .      Cardiovascular: RRR, S1, S2, No Murmurs, rubs or gallops noted.    Gastrointestinal: ND, Soft, NT.    Extremities: NT, AT, no edema, erythema or palpable cord noted.  FROM, = 2+ pulses throughout.      LABS:  CBC Full  -  ( 21 Mar 2021 04:46 )  WBC Count : 7.82 K/uL  RBC Count : 3.73 M/uL  Hemoglobin : 11.5 g/dL  Hematocrit : 33.5 %  Platelet Count - Automated : 184 K/uL  Mean Cell Volume : 89.8 fl  Mean Cell Hemoglobin : 30.8 pg  Mean Cell Hemoglobin Concentration : 34.3 gm/dL  Auto Neutrophil # : 6.54 K/uL  Auto Lymphocyte # : 0.54 K/uL  Auto Monocyte # : 0.59 K/uL  Auto Eosinophil # : 0.10 K/uL  Auto Basophil # : 0.03 K/uL  Auto Neutrophil % : 83.6 %  Auto Lymphocyte % : 6.9 %  Auto Monocyte % : 7.5 %  Auto Eosinophil % : 1.3 %  Auto Basophil % : 0.4 %    03-21    130<L>  |  94<L>  |  14.0  ----------------------------<  126<H>  3.8   |  27.0  |  0.78    Ca    8.3<L>      21 Mar 2021 04:46  Phos  2.9     03-21  Mg     2.0     03-21    TPro  7.1  /  Alb  4.4  /  TBili  0.9  /  DBili  x   /  AST  33  /  ALT  23  /  AlkPhos  84  03-20    PT/INR - ( 20 Mar 2021 11:20 )   PT: 12.5 sec;   INR: 1.08 ratio         PTT - ( 20 Mar 2021 11:20 )  PTT:29.2 sec    RECENT CULTURES:      LIVER FUNCTIONS - ( 20 Mar 2021 11:20 )  Alb: 4.4 g/dL / Pro: 7.1 g/dL / ALK PHOS: 84 U/L / ALT: 23 U/L / AST: 33 U/L / GGT: x           CARDIAC MARKERS ( 20 Mar 2021 11:20 )  x     / <0.01 ng/mL / x     / x     / x          CAPILLARY BLOOD GLUCOSE      RADIOLOGY & ADDITIONAL STUDIES:    ASSESSMENT/PLAN:  55yMale presenting with: R 4-9 rib frx, BL PTX, R Hemothorax, Extensive SubQ air.      Neurological: CW Tylenol ATC, Lidoderm , Motrin PRN, Dilaudid IVP PRN.  Will call [facility] mgt today    Pulmonary: FU AM CXR.  If still significant TAVARES, Would repeat CT for retained Hemothorax and consider CS for CT Surgery/ VATS if high amount. Urge IS.  Pic score.  Maintain Chest tube today on suction.    Gastrointestinal: Reg diet    Genitourinary: Voids    Heme: Lovenox / SCD    ID: None    Dispo: Plan as above.

## 2021-03-22 NOTE — DIETITIAN INITIAL EVALUATION ADULT. - PERTINENT LABORATORY DATA
03-22 Na127 mmol/L<L> Glu 119 mg/dL<H> K+ 3.6 mmol/L Cr  0.81 mg/dL BUN 17.0 mg/dL Phos 2.9 mg/dL Alb n/a   PAB n/a

## 2021-03-22 NOTE — PROCEDURE NOTE - ADDITIONAL PROCEDURE DETAILS
Using the lower border of scapula as a landmark for T7, the T6 spinous process was located. After sterile prep and gloves were don, an ultrasound probe was used to visualize the T6 transverse process. A 22G 3.5in needle was advanced out of plane and 40c of Bupivacaine 0.25% with 5mg PF Decadron was administered into the RUFINA. Aspiration q5ccs was negative for heme/csf/air. Local anesthetic spread was visualized on ultrasound. Images saved to machine.    >80% relief after procedure
I WAS PRESENT FOR THE CRITICAL PARTS OF THE PROCEDURE. LELIA

## 2021-03-22 NOTE — PROCEDURE NOTE - NSINFORMCONSENT_GEN_A_CORE
Benefits, risks, and possible complications of procedure explained to patient/caregiver who verbalized understanding and gave written consent.
verbal consent obtained in trauma bay/This was an emergent procedure.

## 2021-03-22 NOTE — CONSULT NOTE ADULT - ASSESSMENT
55M w/ no significant PMHx presenting as transfer from [place] after fall from stairs about 14ft.   acute R 4th-9th rib fx    Consented for R RUFINA block  r/b/a discussed  see procedure note for details    Recommendations:    Cont  acetaminophen   Tablet .. 650 milliGRAM(s) Oral every 6 hours  HYDROmorphone  Injectable 0.5 milliGRAM(s) IV Push every 3 hours PRN BT pain  ibuprofen  Tablet. 600 milliGRAM(s) Oral every 6 hours with food. HOLD for black/red stool  lidocaine   Patch 12 hours on, 12 hours off. Max 3 patches on at one time   oxyCODONE    IR 5 milliGRAM(s) Oral every 4 hours PRN  oxyCODONE    IR 10 milliGRAM(s) Oral every 4 hours PRN    If pain becomes uncontrolled,  Start flexeril 10mg TID standing. HOLD for sedation

## 2021-03-23 ENCOUNTER — APPOINTMENT (OUTPATIENT)
Dept: THORACIC SURGERY | Facility: HOSPITAL | Age: 56
End: 2021-03-23

## 2021-03-23 LAB
ANION GAP SERPL CALC-SCNC: 10 MMOL/L — SIGNIFICANT CHANGE UP (ref 5–17)
BASOPHILS # BLD AUTO: 0 K/UL — SIGNIFICANT CHANGE UP (ref 0–0.2)
BASOPHILS NFR BLD AUTO: 0 % — SIGNIFICANT CHANGE UP (ref 0–2)
BLD GP AB SCN SERPL QL: SIGNIFICANT CHANGE UP
BUN SERPL-MCNC: 12 MG/DL — SIGNIFICANT CHANGE UP (ref 8–20)
CALCIUM SERPL-MCNC: 8.7 MG/DL — SIGNIFICANT CHANGE UP (ref 8.6–10.2)
CHLORIDE SERPL-SCNC: 97 MMOL/L — LOW (ref 98–107)
CO2 SERPL-SCNC: 26 MMOL/L — SIGNIFICANT CHANGE UP (ref 22–29)
CREAT SERPL-MCNC: 0.64 MG/DL — SIGNIFICANT CHANGE UP (ref 0.5–1.3)
EOSINOPHIL # BLD AUTO: 0 K/UL — SIGNIFICANT CHANGE UP (ref 0–0.5)
EOSINOPHIL NFR BLD AUTO: 0 % — SIGNIFICANT CHANGE UP (ref 0–6)
GLUCOSE SERPL-MCNC: 138 MG/DL — HIGH (ref 70–99)
HCT VFR BLD CALC: 32.5 % — LOW (ref 39–50)
HGB BLD-MCNC: 10.8 G/DL — LOW (ref 13–17)
IMM GRANULOCYTES NFR BLD AUTO: 0.6 % — SIGNIFICANT CHANGE UP (ref 0–1.5)
LYMPHOCYTES # BLD AUTO: 0.16 K/UL — LOW (ref 1–3.3)
LYMPHOCYTES # BLD AUTO: 2.3 % — LOW (ref 13–44)
MAGNESIUM SERPL-MCNC: 2.3 MG/DL — SIGNIFICANT CHANGE UP (ref 1.6–2.6)
MCHC RBC-ENTMCNC: 30.2 PG — SIGNIFICANT CHANGE UP (ref 27–34)
MCHC RBC-ENTMCNC: 33.2 GM/DL — SIGNIFICANT CHANGE UP (ref 32–36)
MCV RBC AUTO: 90.8 FL — SIGNIFICANT CHANGE UP (ref 80–100)
MONOCYTES # BLD AUTO: 0.5 K/UL — SIGNIFICANT CHANGE UP (ref 0–0.9)
MONOCYTES NFR BLD AUTO: 7.3 % — SIGNIFICANT CHANGE UP (ref 2–14)
NEUTROPHILS # BLD AUTO: 6.12 K/UL — SIGNIFICANT CHANGE UP (ref 1.8–7.4)
NEUTROPHILS NFR BLD AUTO: 89.8 % — HIGH (ref 43–77)
PHOSPHATE SERPL-MCNC: 2.7 MG/DL — SIGNIFICANT CHANGE UP (ref 2.4–4.7)
PLATELET # BLD AUTO: 215 K/UL — SIGNIFICANT CHANGE UP (ref 150–400)
POTASSIUM SERPL-MCNC: 4.4 MMOL/L — SIGNIFICANT CHANGE UP (ref 3.5–5.3)
POTASSIUM SERPL-SCNC: 4.4 MMOL/L — SIGNIFICANT CHANGE UP (ref 3.5–5.3)
RBC # BLD: 3.58 M/UL — LOW (ref 4.2–5.8)
RBC # FLD: 12.5 % — SIGNIFICANT CHANGE UP (ref 10.3–14.5)
SODIUM SERPL-SCNC: 133 MMOL/L — LOW (ref 135–145)
WBC # BLD: 6.82 K/UL — SIGNIFICANT CHANGE UP (ref 3.8–10.5)
WBC # FLD AUTO: 6.82 K/UL — SIGNIFICANT CHANGE UP (ref 3.8–10.5)

## 2021-03-23 PROCEDURE — 21812 TREATMENT OF RIB FRACTURE: CPT | Mod: AS

## 2021-03-23 PROCEDURE — 71045 X-RAY EXAM CHEST 1 VIEW: CPT | Mod: 26

## 2021-03-23 PROCEDURE — 71045 X-RAY EXAM CHEST 1 VIEW: CPT | Mod: 26,77

## 2021-03-23 PROCEDURE — 21812 TREATMENT OF RIB FRACTURE: CPT

## 2021-03-23 PROCEDURE — 32150 REMOVAL OF LUNG LESION(S): CPT | Mod: AS

## 2021-03-23 PROCEDURE — 32150 REMOVAL OF LUNG LESION(S): CPT

## 2021-03-23 RX ORDER — IBUPROFEN 200 MG
600 TABLET ORAL EVERY 6 HOURS
Refills: 0 | Status: DISCONTINUED | OUTPATIENT
Start: 2021-03-23 | End: 2021-03-28

## 2021-03-23 RX ORDER — ENOXAPARIN SODIUM 100 MG/ML
40 INJECTION SUBCUTANEOUS DAILY
Refills: 0 | Status: DISCONTINUED | OUTPATIENT
Start: 2021-03-23 | End: 2021-03-28

## 2021-03-23 RX ORDER — ALBUTEROL 90 UG/1
1.25 AEROSOL, METERED ORAL EVERY 6 HOURS
Refills: 0 | Status: DISCONTINUED | OUTPATIENT
Start: 2021-03-23 | End: 2021-03-25

## 2021-03-23 RX ORDER — GABAPENTIN 400 MG/1
300 CAPSULE ORAL THREE TIMES A DAY
Refills: 0 | Status: DISCONTINUED | OUTPATIENT
Start: 2021-03-23 | End: 2021-03-28

## 2021-03-23 RX ORDER — ACETAMINOPHEN 500 MG
650 TABLET ORAL EVERY 6 HOURS
Refills: 0 | Status: DISCONTINUED | OUTPATIENT
Start: 2021-03-23 | End: 2021-03-28

## 2021-03-23 RX ORDER — LIDOCAINE 4 G/100G
1 CREAM TOPICAL EVERY 24 HOURS
Refills: 0 | Status: DISCONTINUED | OUTPATIENT
Start: 2021-03-23 | End: 2021-03-28

## 2021-03-23 RX ORDER — HYDROMORPHONE HYDROCHLORIDE 2 MG/ML
0.5 INJECTION INTRAMUSCULAR; INTRAVENOUS; SUBCUTANEOUS
Refills: 0 | Status: DISCONTINUED | OUTPATIENT
Start: 2021-03-23 | End: 2021-03-28

## 2021-03-23 RX ORDER — SENNA PLUS 8.6 MG/1
2 TABLET ORAL AT BEDTIME
Refills: 0 | Status: DISCONTINUED | OUTPATIENT
Start: 2021-03-23 | End: 2021-03-28

## 2021-03-23 RX ORDER — SODIUM CHLORIDE 9 MG/ML
1000 INJECTION, SOLUTION INTRAVENOUS
Refills: 0 | Status: DISCONTINUED | OUTPATIENT
Start: 2021-03-23 | End: 2021-03-24

## 2021-03-23 RX ORDER — OXYCODONE HYDROCHLORIDE 5 MG/1
10 TABLET ORAL EVERY 4 HOURS
Refills: 0 | Status: DISCONTINUED | OUTPATIENT
Start: 2021-03-23 | End: 2021-03-28

## 2021-03-23 RX ORDER — FENTANYL CITRATE 50 UG/ML
50 INJECTION INTRAVENOUS
Refills: 0 | Status: DISCONTINUED | OUTPATIENT
Start: 2021-03-23 | End: 2021-03-23

## 2021-03-23 RX ORDER — ONDANSETRON 8 MG/1
4 TABLET, FILM COATED ORAL ONCE
Refills: 0 | Status: DISCONTINUED | OUTPATIENT
Start: 2021-03-23 | End: 2021-03-23

## 2021-03-23 RX ORDER — OXYCODONE HYDROCHLORIDE 5 MG/1
5 TABLET ORAL EVERY 4 HOURS
Refills: 0 | Status: DISCONTINUED | OUTPATIENT
Start: 2021-03-23 | End: 2021-03-28

## 2021-03-23 RX ORDER — HYDROMORPHONE HYDROCHLORIDE 2 MG/ML
0.5 INJECTION INTRAMUSCULAR; INTRAVENOUS; SUBCUTANEOUS
Refills: 0 | Status: DISCONTINUED | OUTPATIENT
Start: 2021-03-23 | End: 2021-03-23

## 2021-03-23 RX ORDER — POLYETHYLENE GLYCOL 3350 17 G/17G
17 POWDER, FOR SOLUTION ORAL DAILY
Refills: 0 | Status: DISCONTINUED | OUTPATIENT
Start: 2021-03-23 | End: 2021-03-28

## 2021-03-23 RX ORDER — SODIUM CHLORIDE 9 MG/ML
1000 INJECTION, SOLUTION INTRAVENOUS
Refills: 0 | Status: DISCONTINUED | OUTPATIENT
Start: 2021-03-23 | End: 2021-03-23

## 2021-03-23 RX ADMIN — Medication 650 MILLIGRAM(S): at 04:50

## 2021-03-23 RX ADMIN — Medication 650 MILLIGRAM(S): at 23:17

## 2021-03-23 RX ADMIN — Medication 600 MILLIGRAM(S): at 04:50

## 2021-03-23 RX ADMIN — Medication 600 MILLIGRAM(S): at 18:30

## 2021-03-23 RX ADMIN — OXYCODONE HYDROCHLORIDE 10 MILLIGRAM(S): 5 TABLET ORAL at 15:43

## 2021-03-23 RX ADMIN — HYDROMORPHONE HYDROCHLORIDE 0.5 MILLIGRAM(S): 2 INJECTION INTRAMUSCULAR; INTRAVENOUS; SUBCUTANEOUS at 12:43

## 2021-03-23 RX ADMIN — Medication 650 MILLIGRAM(S): at 04:18

## 2021-03-23 RX ADMIN — ENOXAPARIN SODIUM 40 MILLIGRAM(S): 100 INJECTION SUBCUTANEOUS at 17:45

## 2021-03-23 RX ADMIN — HYDROMORPHONE HYDROCHLORIDE 0.5 MILLIGRAM(S): 2 INJECTION INTRAMUSCULAR; INTRAVENOUS; SUBCUTANEOUS at 13:26

## 2021-03-23 RX ADMIN — Medication 600 MILLIGRAM(S): at 23:17

## 2021-03-23 RX ADMIN — OXYCODONE HYDROCHLORIDE 10 MILLIGRAM(S): 5 TABLET ORAL at 16:30

## 2021-03-23 RX ADMIN — ALBUTEROL 1.25 MILLIGRAM(S): 90 AEROSOL, METERED ORAL at 21:00

## 2021-03-23 RX ADMIN — OXYCODONE HYDROCHLORIDE 10 MILLIGRAM(S): 5 TABLET ORAL at 22:15

## 2021-03-23 RX ADMIN — LIDOCAINE 1 PATCH: 4 CREAM TOPICAL at 17:46

## 2021-03-23 RX ADMIN — Medication 650 MILLIGRAM(S): at 17:46

## 2021-03-23 RX ADMIN — OXYCODONE HYDROCHLORIDE 10 MILLIGRAM(S): 5 TABLET ORAL at 03:03

## 2021-03-23 RX ADMIN — SODIUM CHLORIDE 75 MILLILITER(S): 9 INJECTION INTRAMUSCULAR; INTRAVENOUS; SUBCUTANEOUS at 04:17

## 2021-03-23 RX ADMIN — HYDROMORPHONE HYDROCHLORIDE 0.5 MILLIGRAM(S): 2 INJECTION INTRAMUSCULAR; INTRAVENOUS; SUBCUTANEOUS at 13:28

## 2021-03-23 RX ADMIN — GABAPENTIN 300 MILLIGRAM(S): 400 CAPSULE ORAL at 17:45

## 2021-03-23 RX ADMIN — HYDROMORPHONE HYDROCHLORIDE 0.5 MILLIGRAM(S): 2 INJECTION INTRAMUSCULAR; INTRAVENOUS; SUBCUTANEOUS at 04:37

## 2021-03-23 RX ADMIN — OXYCODONE HYDROCHLORIDE 10 MILLIGRAM(S): 5 TABLET ORAL at 03:52

## 2021-03-23 RX ADMIN — Medication 650 MILLIGRAM(S): at 18:30

## 2021-03-23 RX ADMIN — Medication 600 MILLIGRAM(S): at 04:18

## 2021-03-23 RX ADMIN — OXYCODONE HYDROCHLORIDE 10 MILLIGRAM(S): 5 TABLET ORAL at 21:28

## 2021-03-23 RX ADMIN — LIDOCAINE 1 PATCH: 4 CREAM TOPICAL at 19:53

## 2021-03-23 RX ADMIN — GABAPENTIN 300 MILLIGRAM(S): 400 CAPSULE ORAL at 21:28

## 2021-03-23 RX ADMIN — Medication 600 MILLIGRAM(S): at 17:46

## 2021-03-23 RX ADMIN — HYDROMORPHONE HYDROCHLORIDE 0.5 MILLIGRAM(S): 2 INJECTION INTRAMUSCULAR; INTRAVENOUS; SUBCUTANEOUS at 04:18

## 2021-03-23 RX ADMIN — SENNA PLUS 2 TABLET(S): 8.6 TABLET ORAL at 21:28

## 2021-03-23 NOTE — BRIEF OPERATIVE NOTE - NSICDXBRIEFPREOP_GEN_ALL_CORE_FT
PRE-OP DIAGNOSIS:  Hemothorax, right 23-Mar-2021 12:10:56  She Aldridge  Traumatic closed displaced fracture of rib 23-Mar-2021 12:10:22  She Aldridge

## 2021-03-23 NOTE — PROGRESS NOTE ADULT - SUBJECTIVE AND OBJECTIVE BOX
24 HOUR EVENTS: Patient with persistent hemothorax on AM CXR 3/22, continued to express Right sided rib pain. Pain management consulted and paravertebral rib block performed with some improvement, per patient. Overnight patient reports feeling a persistent leaking onto skin from chest tube after ambulating from the chair to the bed. CT evaluated and re-enforced with occlusive dressing. No significant air leak. Tube in proper positioning. CXR obtained. PIC score 6-7 this morning. Patient pre-opd for evacuation of hemothorax right chest and rib plating.     SUBJECTIVE/ROS:  [x] A ten-point review of systems was otherwise negative except as noted.      NEURO  GCS: 15    Exam: A&Ox3, PICC 6-7  Meds: acetaminophen   Tablet .. 650 milliGRAM(s) Oral every 6 hours  HYDROmorphone  Injectable 0.5 milliGRAM(s) IV Push every 3 hours PRN breakthrough pain  ibuprofen  Tablet. 600 milliGRAM(s) Oral every 6 hours  oxyCODONE    IR 5 milliGRAM(s) Oral every 4 hours PRN Moderate Pain (4 - 6)  oxyCODONE    IR 10 milliGRAM(s) Oral every 4 hours PRN Severe Pain (7 - 10)    [x] Adequacy of sedation and pain control has been assessed and adjusted      RESPIRATORY  RR: 16 (03-23-21 @ 01:00) (12 - 22)  SpO2: 98% (03-23-21 @ 01:00) (91% - 100%)  Exam: unlabored, low air entry right thorax         CARDIOVASCULAR  HR: 80 (03-23-21 @ 01:00) (63 - 100)  BP: 155/90 (03-23-21 @ 01:00) (118/81 - 165/101)  BP(mean): 106 (03-23-21 @ 01:00) (93 - 120)      Exam:  Cardiac Rhythm: NSR   Perfusion     [X]Adequate     Mentation   [X]Normal   Extremities  [X]Warm      Volume Status [X]Euvolemic  Meds:       GI/NUTRITION  Exam: soft, ND, NTTP   Diet: Regular/ NPOaMN  Meds: polyethylene glycol 3350 17 Gram(s) Oral daily  senna 2 Tablet(s) Oral at bedtime      GENITOURINARY  I&O's Detail    03-21 @ 07:01  -  03-22 @ 07:00  --------------------------------------------------------  IN:    IV PiggyBack: 187.5 mL    Oral Fluid: 1960 mL  Total IN: 2147.5 mL    OUT:    Chest Tube (mL): 550 mL    Voided (mL): 2400 mL  Total OUT: 2950 mL    Total NET: -802.5 mL      03-22 @ 07:01  -  03-23 @ 04:06  --------------------------------------------------------  IN:    Oral Fluid: 480 mL    sodium chloride 0.9%: 975 mL    Sodium Chloride 0.9% Bolus: 1000 mL  Total IN: 2455 mL    OUT:    Chest Tube (mL): 150 mL    Voided (mL): 3900 mL  Total OUT: 4050 mL    Total NET: -1595 mL          03-22    133<L>  |  96<L>  |  16.0  ----------------------------<  148<H>  4.3   |  28.0  |  0.77    Ca    9.0      22 Mar 2021 17:24  Phos  2.9     03-22  Mg     2.2     03-22      [ ] Hernández catheter, indication: N/A  Meds: sodium chloride 0.9%. 1000 milliLiter(s) IV Continuous <Continuous>        HEMATOLOGIC  Meds: enoxaparin Injectable 40 milliGRAM(s) SubCutaneous daily    [x] VTE Prophylaxis                        10.8   6.82  )-----------( 215      ( 23 Mar 2021 03:55 )             32.5       Transfusion     [ ] PRBC   [ ] Platelets   [ ] FFP   [ ] Cryoprecipitate      INFECTIOUS DISEASES  T(C): 36.6 (03-23-21 @ 03:13), Max: 37.1 (03-22-21 @ 07:23)  Wt(kg): --  WBC Count: 6.82 K/uL (03-23 @ 03:55)  WBC Count: 6.09 K/uL (03-22 @ 06:30)    Recent Cultures:    Meds: influenza   Vaccine 0.5 milliLiter(s) IntraMuscular once        ENDOCRINE  Capillary Blood Glucose    Meds:       ACCESS DEVICES:  [x] Peripheral IV  [x] RT sided CT      OTHER MEDICATIONS:  chlorhexidine 2% Cloths 1 Application(s) Topical daily  lidocaine   Patch 1 Patch Transdermal every 24 hours      .

## 2021-03-23 NOTE — BRIEF OPERATIVE NOTE - NSICDXBRIEFPOSTOP_GEN_ALL_CORE_FT
POST-OP DIAGNOSIS:  Hemothorax, right 23-Mar-2021 12:11:48  She Aldridge  Closed traumatic displaced fracture of rib 23-Mar-2021 12:11:38  She Aldridge

## 2021-03-23 NOTE — BRIEF OPERATIVE NOTE - COMMENTS
no pressor requirement or colloid product needed  wound class I  stat CXR in PACU, repeat CXR in am   ISABELLE removal on 3/24/21  CT to waterseal pod#1 and subsequent removal 48hr post op based on re-evaluation from surgery team

## 2021-03-23 NOTE — PROGRESS NOTE ADULT - SUBJECTIVE AND OBJECTIVE BOX
Post-op Check    Subjective:  Pt c/o pain with forced coughs and occassional burning chest pain over his chest wall when his chest tube is manipulated. States that the oral oxycodone didn't provide relief but the IV dilaudid did. Denies CP at rest, SOB, fever, chills, cough. He recently removed his nasal cannula because he felt he didn't need it. He states that he was SpO2 98% on RA    STATUS POST:  rib plating    POST OPERATIVE DAY #: 0    MEDICATIONS  (STANDING):  acetaminophen   Tablet .. 650 milliGRAM(s) Oral every 6 hours  ALBUTerol   0.042% 1.25 milliGRAM(s) Nebulizer every 6 hours  enoxaparin Injectable 40 milliGRAM(s) SubCutaneous daily  gabapentin 300 milliGRAM(s) Oral three times a day  ibuprofen  Tablet. 600 milliGRAM(s) Oral every 6 hours  influenza   Vaccine 0.5 milliLiter(s) IntraMuscular once  lactated ringers. 1000 milliLiter(s) (75 mL/Hr) IV Continuous <Continuous>  lidocaine   Patch 1 Patch Transdermal every 24 hours  polyethylene glycol 3350 17 Gram(s) Oral daily  senna 2 Tablet(s) Oral at bedtime    MEDICATIONS  (PRN):  HYDROmorphone  Injectable 0.5 milliGRAM(s) IV Push every 3 hours PRN breakthrough pain  oxyCODONE    IR 5 milliGRAM(s) Oral every 4 hours PRN Moderate Pain (4 - 6)  oxyCODONE    IR 10 milliGRAM(s) Oral every 4 hours PRN Severe Pain (7 - 10)      Vital Signs Last 24 Hrs  T(C): 36.4 (23 Mar 2021 15:50), Max: 37.2 (23 Mar 2021 11:56)  T(F): 97.6 (23 Mar 2021 15:50), Max: 99 (23 Mar 2021 11:56)  HR: 82 (23 Mar 2021 15:50) (66 - 99)  BP: 156/84 (23 Mar 2021 15:50) (135/86 - 169/98)  BP(mean): 111 (23 Mar 2021 07:00) (100 - 118)  RR: 19 (23 Mar 2021 15:50) (10 - 21)  SpO2: 98% (23 Mar 2021 15:50) (94% - 100%)    Physical Exam:    General: Laying comfortably in bed, NAD  HEENT: PERRL, EOMI  Neck: No JVD, FROM without pain  Respiratory: no accessory muscle use, respirations non-labored, right sided chest tube collecting SS fluid, no air leak  Abdomen: soft, NT/ND  Neurological: A&O x 3; without gross deficit    A: 55M clinically well s/p rib plating, pain regimen needs adjustment    P:  Continue current care  Gabapentin added for additional pain control  OOB as tolerated  Chest tube to suction  Encourage IS  DVT ppx

## 2021-03-23 NOTE — CHART NOTE - NSCHARTNOTEFT_GEN_A_CORE
CXR reviewed. Chest tube in appropriate position. Lung up.  Patient stable condition. Plan for discharge from PACU to floor
Pt with worsening pain after coughing.  Given additional dose Dilaudid with relief.  Pt has h/o HTN however endorses that he has been off of his antihypertensives for the past year (he can not recall which medications he was on).  When in acute pain,   pt with Systolic >200 and diastolic >100.  Given Dilaudid with addition of Labetalol with temporary relief.  Additional Dilaudid then followed by Hydralazine with improve BP control.      After pt medicated, his PIC score is a 7.  Prior to arrival to the ICU, PIC score 4
SICU TRANSFER NOTE  -----------------------------  ICU Admission Date: 3/20/21  Transfer Date: 03-23-21 @ 17:04    Admission Diagnosis: Rib fx, b/l pneumo, subcutaneous emphysema    Active Problems/injuries: rib fx, b/l pneumo, subcutaneous emphysema    Procedures: 3/23: Rib plating and evacuation of hemothorax    Consultants:  [ ] Cardiology  [ ] Endocrine  [ ] Infectious Disease  [ ] Medicine  [ ]Neurosurgery  [ ] Ortho       [ ] Weight Bearing Status:  [ ] Palliative       [ ] Advanced Directives:    [ ] Physical Medicine and Rehab       [ ] Disposition :   [ ] Plastics  [ ] Pulmonary  [X] Pain management    Medications  acetaminophen   Tablet .. 650 milliGRAM(s) Oral every 6 hours  ALBUTerol   0.042% 1.25 milliGRAM(s) Nebulizer every 6 hours  enoxaparin Injectable 40 milliGRAM(s) SubCutaneous daily  gabapentin 300 milliGRAM(s) Oral three times a day  HYDROmorphone  Injectable 0.5 milliGRAM(s) IV Push every 3 hours PRN  ibuprofen  Tablet. 600 milliGRAM(s) Oral every 6 hours  influenza   Vaccine 0.5 milliLiter(s) IntraMuscular once  lactated ringers. 1000 milliLiter(s) IV Continuous <Continuous>  lidocaine   Patch 1 Patch Transdermal every 24 hours  oxyCODONE    IR 5 milliGRAM(s) Oral every 4 hours PRN  oxyCODONE    IR 10 milliGRAM(s) Oral every 4 hours PRN  polyethylene glycol 3350 17 Gram(s) Oral daily  senna 2 Tablet(s) Oral at bedtime      [X] I attest I have reviewed and reconciled all medications prior to transfer    IV Fluids  lactated ringers.: Solution, 1000 milliLiter(s) infuse at 75 mL/Hr  Provider's Contact #: [phone]  lactated ringers.: Solution, 1000 milliLiter(s) infuse at 75 mL/Hr  Provider's Contact #: [phone]  sodium chloride 0.9% Bolus:   1000 milliLiter(s), IV Bolus, once, infuse over 30 Minute(s), Stop After 1 Doses  Provider's Contact #: [phone]  sodium chloride 0.9%.: Solution, 1000 milliLiter(s) infuse at 75 mL/Hr  Provider's Contact #: [phone]  sodium chloride 0.9%.: Solution, 1000 milliLiter(s) infuse at 75 mL/Hr  Special Instructions: To be started at midnight when patient becomes NPO  Provider's Contact #: [phone]    Antibiotics: None      I have discussed this case with Dr. Stephan Núñez upon transfer and all questions regarding ICU course were answered.  The following items are to be followed up:  AM CXR - if no pneumo can place Chest tube to waterseal  Incentive spirometer, pulm toileting  ISABELLE drain to be removed on 3/24 if minimal output  Pain control - f/u pain management for additional rib blocks  Hypovolemic Hyponatremia resolving - monitor AM sodium level  Regular diet  DVT ppx
This provider spoke on the phone with Radiology department that states Esophagograms are only performed monday to Friday.     Ananda Painting
SICU/Trauma Surgery Preop Note  Preop Dx: RT displaced rib fractures 4-9th, Hemothorax     Planned Procedure Date: 3/23/21    Planned Procedure: Evacuation of right chest hemothorax, Rib plating RT side    Medical Clearance:             [x ] Medical clearance not indicated.           Changes to Diet:             [x ] Patient to be NPO except meds starting at 23:59 on ____            [x ] Crystalloid IVF at rate of 100cc/hr to start    Medication Changes:            [x] Maintain PO pain meds     Anticoagulation/DVT PPx Restrictions:            [x] No restrictions, continue prophylactic lovenox/heparin             CXR:             [x ] CXR taken on 3/22 reviewed, re-demonstration of hemothroax and significant subq emphysema             EKG:             [x ] Preop EKG not indicated.           Echo:                     [x ] Preop echocardiogram not indicated.     Type and Screen:             [x ] Type and screen to be performed w/ am labs on 3/23/21    Blood Products:            [x] 2U PRBC on hold for OR           Preoperative AM labs:             [x ] CBC, BMP, Mg, Phos, Ptt, INR, type and screen pending for AM 3/23/21

## 2021-03-23 NOTE — BRIEF OPERATIVE NOTE - OPERATION/FINDINGS
Previous CT removed at bedside prior to prep. Posterior lateral approach incision made with identification of auscultatory triangle. Chest wall exposure performed with preservation of latissimus dorsi and careful dissection of serratus. Displaced fractures of ribs 6-9  observed. Anterior and posterior components of each respective rib identified and reduced appropriately.  ORIF using locking plates performed on ribs 6-9 with self driving screws. Evacuation of hemothorax performed and Pleural space irrigated. 28F CT placed between rib space 8-9.  10 ISABELLE placed at level of chest wall. Serratus repair performed and fascia closed with 0 vicryl. Subsequent layered closed performed with 2.0 vicryl and 3.0 moncoryl. Steris to skin.

## 2021-03-23 NOTE — PROGRESS NOTE ADULT - SUBJECTIVE AND OBJECTIVE BOX
Patient unable to be seen during rounds as pt was in OR    Please page with any questions/concerns    Otherwise, will see patient tomorrow            Pain Service   [phone] Interval Hx:  Patient seen and examined during rounds   seen in PACU postop  endorses good pain control postop as well as overnight  denies sedation with meds     PHYSICAL EXAM:    GENERAL: NAD, well-developed  HEAD:  Atraumatic, Normocephalic  NERVOUS SYSTEM:  Alert & Oriented X3, Good concentration  CHEST/LUNG: Equal lung expansion bilaterally. R CT and brusing along R Flank noted on exam  HEART: Regular rate and rhythm  EXTREMITIES:  2+ Peripheral Pulses      T(C): 36.7 (03-23-21 @ 07:49), Max: 36.9 (03-22-21 @ 15:46)  HR: 73 (03-23-21 @ 07:00) (66 - 100)  BP: 137/98 (03-23-21 @ 07:00) (130/93 - 165/101)  RR: 10 (03-23-21 @ 07:00) (10 - 22)  SpO2: 97% (03-23-21 @ 07:00) (94% - 98%)      03-22-21 @ 07:01  -  03-23-21 @ 07:00  --------------------------------------------------------  IN: 2830 mL / OUT: 5600 mL / NET: -2770 mL        acetaminophen   Tablet .. 650 milliGRAM(s) Oral every 6 hours  enoxaparin Injectable 40 milliGRAM(s) SubCutaneous daily  HYDROmorphone  Injectable 0.5 milliGRAM(s) IV Push every 3 hours PRN  ibuprofen  Tablet. 600 milliGRAM(s) Oral every 6 hours  influenza   Vaccine 0.5 milliLiter(s) IntraMuscular once  lidocaine   Patch 1 Patch Transdermal every 24 hours  oxyCODONE    IR 5 milliGRAM(s) Oral every 4 hours PRN  oxyCODONE    IR 10 milliGRAM(s) Oral every 4 hours PRN  polyethylene glycol 3350 17 Gram(s) Oral daily  senna 2 Tablet(s) Oral at bedtime  sodium phosphate IVPB 15 milliMole(s) IV Intermittent once                          10.8   6.82  )-----------( 215      ( 23 Mar 2021 03:55 )             32.5     03-23    133<L>  |  97<L>  |  12.0  ----------------------------<  138<H>  4.4   |  26.0  |  0.64    Ca    8.7      23 Mar 2021 03:55  Phos  2.7     03-23  Mg     2.3     03-23            Pain Service   [phone]    < from: CT Chest w/ IV Cont (03.20.21 @ 06:46) >  Bones: 2 part fractures of the right fourth rib, including a posterior fracture fragment that projects into the pleura and mildly displaced fracture anteriorly. 2 part fractures of the posterior and lateral fifth rib with small fragments that project into the pleural space. 2 part fractures of the posterior and lateral sixth rib with bone fragments that project into the pleura. 2 part fracture of the seventh posterior and lateral seventh rib with complete anterior displacement of the lateral fragment. There are 2 severely comminuted fractures of the right posterior eighth rib including a 2 cm bone fragment that projects into the right hemithorax. Three-part comminuted fracture of the right ninth rib with some bone fragments that mildly projected into the pleural space. Old fracture of the left lateral fourth fifth, and sixth ribs. Postsurgical changes in the right proximal humerus including a lateral sideplate and screws throughout the right humeral shaft.    < end of copied text >

## 2021-03-24 LAB
ANION GAP SERPL CALC-SCNC: 8 MMOL/L — SIGNIFICANT CHANGE UP (ref 5–17)
BASOPHILS # BLD AUTO: 0.04 K/UL — SIGNIFICANT CHANGE UP (ref 0–0.2)
BASOPHILS NFR BLD AUTO: 0.6 % — SIGNIFICANT CHANGE UP (ref 0–2)
BUN SERPL-MCNC: 13 MG/DL — SIGNIFICANT CHANGE UP (ref 8–20)
CALCIUM SERPL-MCNC: 8.4 MG/DL — LOW (ref 8.6–10.2)
CHLORIDE SERPL-SCNC: 101 MMOL/L — SIGNIFICANT CHANGE UP (ref 98–107)
CO2 SERPL-SCNC: 29 MMOL/L — SIGNIFICANT CHANGE UP (ref 22–29)
CREAT SERPL-MCNC: 0.65 MG/DL — SIGNIFICANT CHANGE UP (ref 0.5–1.3)
EOSINOPHIL # BLD AUTO: 0.35 K/UL — SIGNIFICANT CHANGE UP (ref 0–0.5)
EOSINOPHIL NFR BLD AUTO: 5.5 % — SIGNIFICANT CHANGE UP (ref 0–6)
GLUCOSE SERPL-MCNC: 113 MG/DL — HIGH (ref 70–99)
HCT VFR BLD CALC: 30.7 % — LOW (ref 39–50)
HGB BLD-MCNC: 9.6 G/DL — LOW (ref 13–17)
IMM GRANULOCYTES NFR BLD AUTO: 0.3 % — SIGNIFICANT CHANGE UP (ref 0–1.5)
LYMPHOCYTES # BLD AUTO: 0.6 K/UL — LOW (ref 1–3.3)
LYMPHOCYTES # BLD AUTO: 9.4 % — LOW (ref 13–44)
MAGNESIUM SERPL-MCNC: 2.2 MG/DL — SIGNIFICANT CHANGE UP (ref 1.8–2.6)
MCHC RBC-ENTMCNC: 29 PG — SIGNIFICANT CHANGE UP (ref 27–34)
MCHC RBC-ENTMCNC: 31.3 GM/DL — LOW (ref 32–36)
MCV RBC AUTO: 92.7 FL — SIGNIFICANT CHANGE UP (ref 80–100)
MONOCYTES # BLD AUTO: 0.68 K/UL — SIGNIFICANT CHANGE UP (ref 0–0.9)
MONOCYTES NFR BLD AUTO: 10.6 % — SIGNIFICANT CHANGE UP (ref 2–14)
NEUTROPHILS # BLD AUTO: 4.72 K/UL — SIGNIFICANT CHANGE UP (ref 1.8–7.4)
NEUTROPHILS NFR BLD AUTO: 73.6 % — SIGNIFICANT CHANGE UP (ref 43–77)
PHOSPHATE SERPL-MCNC: 3.2 MG/DL — SIGNIFICANT CHANGE UP (ref 2.4–4.7)
PLATELET # BLD AUTO: 264 K/UL — SIGNIFICANT CHANGE UP (ref 150–400)
POTASSIUM SERPL-MCNC: 4 MMOL/L — SIGNIFICANT CHANGE UP (ref 3.5–5.3)
POTASSIUM SERPL-SCNC: 4 MMOL/L — SIGNIFICANT CHANGE UP (ref 3.5–5.3)
RBC # BLD: 3.31 M/UL — LOW (ref 4.2–5.8)
RBC # FLD: 12.9 % — SIGNIFICANT CHANGE UP (ref 10.3–14.5)
SODIUM SERPL-SCNC: 138 MMOL/L — SIGNIFICANT CHANGE UP (ref 135–145)
WBC # BLD: 6.41 K/UL — SIGNIFICANT CHANGE UP (ref 3.8–10.5)
WBC # FLD AUTO: 6.41 K/UL — SIGNIFICANT CHANGE UP (ref 3.8–10.5)

## 2021-03-24 PROCEDURE — 99024 POSTOP FOLLOW-UP VISIT: CPT

## 2021-03-24 PROCEDURE — 71045 X-RAY EXAM CHEST 1 VIEW: CPT | Mod: 26

## 2021-03-24 RX ORDER — LACTULOSE 10 G/15ML
20 SOLUTION ORAL ONCE
Refills: 0 | Status: COMPLETED | OUTPATIENT
Start: 2021-03-24 | End: 2021-03-24

## 2021-03-24 RX ADMIN — LIDOCAINE 1 PATCH: 4 CREAM TOPICAL at 19:29

## 2021-03-24 RX ADMIN — Medication 600 MILLIGRAM(S): at 23:13

## 2021-03-24 RX ADMIN — HYDROMORPHONE HYDROCHLORIDE 0.5 MILLIGRAM(S): 2 INJECTION INTRAMUSCULAR; INTRAVENOUS; SUBCUTANEOUS at 11:45

## 2021-03-24 RX ADMIN — OXYCODONE HYDROCHLORIDE 10 MILLIGRAM(S): 5 TABLET ORAL at 07:00

## 2021-03-24 RX ADMIN — ALBUTEROL 1.25 MILLIGRAM(S): 90 AEROSOL, METERED ORAL at 14:23

## 2021-03-24 RX ADMIN — GABAPENTIN 300 MILLIGRAM(S): 400 CAPSULE ORAL at 23:13

## 2021-03-24 RX ADMIN — Medication 650 MILLIGRAM(S): at 10:56

## 2021-03-24 RX ADMIN — Medication 650 MILLIGRAM(S): at 07:00

## 2021-03-24 RX ADMIN — OXYCODONE HYDROCHLORIDE 10 MILLIGRAM(S): 5 TABLET ORAL at 03:00

## 2021-03-24 RX ADMIN — LIDOCAINE 1 PATCH: 4 CREAM TOPICAL at 18:00

## 2021-03-24 RX ADMIN — OXYCODONE HYDROCHLORIDE 10 MILLIGRAM(S): 5 TABLET ORAL at 06:19

## 2021-03-24 RX ADMIN — Medication 650 MILLIGRAM(S): at 06:19

## 2021-03-24 RX ADMIN — Medication 600 MILLIGRAM(S): at 07:00

## 2021-03-24 RX ADMIN — OXYCODONE HYDROCHLORIDE 10 MILLIGRAM(S): 5 TABLET ORAL at 11:47

## 2021-03-24 RX ADMIN — HYDROMORPHONE HYDROCHLORIDE 0.5 MILLIGRAM(S): 2 INJECTION INTRAMUSCULAR; INTRAVENOUS; SUBCUTANEOUS at 01:05

## 2021-03-24 RX ADMIN — Medication 650 MILLIGRAM(S): at 23:13

## 2021-03-24 RX ADMIN — OXYCODONE HYDROCHLORIDE 10 MILLIGRAM(S): 5 TABLET ORAL at 10:57

## 2021-03-24 RX ADMIN — OXYCODONE HYDROCHLORIDE 10 MILLIGRAM(S): 5 TABLET ORAL at 02:19

## 2021-03-24 RX ADMIN — SENNA PLUS 2 TABLET(S): 8.6 TABLET ORAL at 23:13

## 2021-03-24 RX ADMIN — HYDROMORPHONE HYDROCHLORIDE 0.5 MILLIGRAM(S): 2 INJECTION INTRAMUSCULAR; INTRAVENOUS; SUBCUTANEOUS at 23:17

## 2021-03-24 RX ADMIN — ALBUTEROL 1.25 MILLIGRAM(S): 90 AEROSOL, METERED ORAL at 08:37

## 2021-03-24 RX ADMIN — LIDOCAINE 1 PATCH: 4 CREAM TOPICAL at 06:30

## 2021-03-24 RX ADMIN — Medication 650 MILLIGRAM(S): at 00:00

## 2021-03-24 RX ADMIN — OXYCODONE HYDROCHLORIDE 5 MILLIGRAM(S): 5 TABLET ORAL at 21:11

## 2021-03-24 RX ADMIN — Medication 600 MILLIGRAM(S): at 00:00

## 2021-03-24 RX ADMIN — HYDROMORPHONE HYDROCHLORIDE 0.5 MILLIGRAM(S): 2 INJECTION INTRAMUSCULAR; INTRAVENOUS; SUBCUTANEOUS at 01:20

## 2021-03-24 RX ADMIN — GABAPENTIN 300 MILLIGRAM(S): 400 CAPSULE ORAL at 06:18

## 2021-03-24 RX ADMIN — ALBUTEROL 1.25 MILLIGRAM(S): 90 AEROSOL, METERED ORAL at 20:46

## 2021-03-24 RX ADMIN — HYDROMORPHONE HYDROCHLORIDE 0.5 MILLIGRAM(S): 2 INJECTION INTRAMUSCULAR; INTRAVENOUS; SUBCUTANEOUS at 23:32

## 2021-03-24 RX ADMIN — GABAPENTIN 300 MILLIGRAM(S): 400 CAPSULE ORAL at 15:57

## 2021-03-24 RX ADMIN — Medication 600 MILLIGRAM(S): at 06:18

## 2021-03-24 RX ADMIN — LACTULOSE 20 GRAM(S): 10 SOLUTION ORAL at 19:28

## 2021-03-24 RX ADMIN — HYDROMORPHONE HYDROCHLORIDE 0.5 MILLIGRAM(S): 2 INJECTION INTRAMUSCULAR; INTRAVENOUS; SUBCUTANEOUS at 11:30

## 2021-03-24 RX ADMIN — Medication 600 MILLIGRAM(S): at 10:56

## 2021-03-24 RX ADMIN — OXYCODONE HYDROCHLORIDE 5 MILLIGRAM(S): 5 TABLET ORAL at 20:11

## 2021-03-24 RX ADMIN — POLYETHYLENE GLYCOL 3350 17 GRAM(S): 17 POWDER, FOR SOLUTION ORAL at 11:31

## 2021-03-24 RX ADMIN — ENOXAPARIN SODIUM 40 MILLIGRAM(S): 100 INJECTION SUBCUTANEOUS at 10:56

## 2021-03-24 NOTE — PROGRESS NOTE ADULT - SUBJECTIVE AND OBJECTIVE BOX
SUBJECTIVE/24 hour events:  Patient is a 55yMale s/p 14 feet fall from a ladder, sustaining right 4-9th rib fx with b/l pneumo and right hemothorax, now POD# 1 of rib plating with right 4th -9th ribs and replacement of right chest tube. Patient with no acute events overnight, pulling 3000 on the incentive spirometer, chest tube to waterseal, pain controlled on current regimen. Patient had no requests or complaints at this time.       Vital Signs Last 24 Hrs  T(C): 36.8 (23 Mar 2021 17:29), Max: 37.2 (23 Mar 2021 11:56)  T(F): 98.3 (23 Mar 2021 17:29), Max: 99 (23 Mar 2021 11:56)  HR: 75 (24 Mar 2021 01:05) (66 - 99)  BP: 138/89 (24 Mar 2021 01:05) (134/83 - 169/98)  BP(mean): 111 (23 Mar 2021 07:00) (110 - 116)  RR: 18 (24 Mar 2021 01:05) (10 - 19)  SpO2: 96% (24 Mar 2021 01:05) (94% - 100%)  Drug Dosing Weight  Height (cm): 195.6 (20 Mar 2021 12:47)  Weight (kg): 97 (20 Mar 2021 12:47)  BMI (kg/m2): 25.4 (20 Mar 2021 12:47)  BSA (m2): 2.3 (20 Mar 2021 12:47)  I&O's Detail    22 Mar 2021 07:01  -  23 Mar 2021 07:00  --------------------------------------------------------  IN:    Oral Fluid: 480 mL    sodium chloride 0.9%: 1350 mL    Sodium Chloride 0.9% Bolus: 1000 mL  Total IN: 2830 mL    OUT:    Chest Tube (mL): 450 mL    Voided (mL): 5150 mL  Total OUT: 5600 mL    Total NET: -2770 mL      23 Mar 2021 07:01  -  24 Mar 2021 04:07  --------------------------------------------------------  IN:    Lactated Ringers: 300 mL  Total IN: 300 mL    OUT:    Bulb (mL): 100 mL    Chest Tube (mL): 610 mL    Voided (mL): 1410 mL  Total OUT: 2120 mL    Total NET: -1820 mL        Allergies    No Known Allergies    Intolerances                              10.8   6.82  )-----------( 215      ( 23 Mar 2021 03:55 )             32.5   03-23    133<L>  |  97<L>  |  12.0  ----------------------------<  138<H>  4.4   |  26.0  |  0.64    Ca    8.7      23 Mar 2021 03:55  Phos  2.7     03-23  Mg     2.3     03-23        ROS:    PHYSICAL EXAM:  General: Laying comfortably in bed, NAD  HEENT: PERRL, EOMI  Neck: No JVD, FROM without pain  Respiratory: no accessory muscle use, respirations non-labored, right sided chest tube collecting SS fluid, no air leak, 3000 on the incentive spirometer, pic score 8  Abdomen: soft, NT/ND  Neurological: A&O x 3; without gross deficit        MEDICATIONS  (STANDING):  acetaminophen   Tablet .. 650 milliGRAM(s) Oral every 6 hours  ALBUTerol   0.042% 1.25 milliGRAM(s) Nebulizer every 6 hours  enoxaparin Injectable 40 milliGRAM(s) SubCutaneous daily  gabapentin 300 milliGRAM(s) Oral three times a day  ibuprofen  Tablet. 600 milliGRAM(s) Oral every 6 hours  influenza   Vaccine 0.5 milliLiter(s) IntraMuscular once  lactated ringers. 1000 milliLiter(s) (75 mL/Hr) IV Continuous <Continuous>  lidocaine   Patch 1 Patch Transdermal every 24 hours  polyethylene glycol 3350 17 Gram(s) Oral daily  senna 2 Tablet(s) Oral at bedtime    MEDICATIONS  (PRN):  HYDROmorphone  Injectable 0.5 milliGRAM(s) IV Push every 3 hours PRN breakthrough pain  oxyCODONE    IR 5 milliGRAM(s) Oral every 4 hours PRN Moderate Pain (4 - 6)  oxyCODONE    IR 10 milliGRAM(s) Oral every 4 hours PRN Severe Pain (7 - 10)      RADIOLOGY STUDIES:    CULTURES:

## 2021-03-24 NOTE — PHYSICAL THERAPY INITIAL EVALUATION ADULT - ASR WT BEARING STATUS EVAL
Continue duonebs 3 times a day and every 6 hours as needed for increased cough, wheezing or shortness of breath    Get your blood drawn across the nath prior to leaving facility    Have influenza vaccine if appropriate     Continue all your same medication
no weight-bearing restrictions

## 2021-03-24 NOTE — PROGRESS NOTE ADULT - PROBLEM SELECTOR PLAN 2
monitor chest tube out put  change dressing as needed  f.u am chest xray   continue pic protocol  pain control  pulmonary toliet
Dr Mota to eval possible rib plating  Repeat chest CT as per Dr Mota

## 2021-03-24 NOTE — PROGRESS NOTE ADULT - SUBJECTIVE AND OBJECTIVE BOX
Interval Hx:  Patient seen and examined during rounds   endorses good pain control  states that pain block has worn off but not interested in repeat block as pain is controlled with meds  denies sedation    PHYSICAL EXAM:    GENERAL: NAD, well-developed  HEAD:  Atraumatic, Normocephalic  NERVOUS SYSTEM:  Alert & Oriented X3, Good concentration  CHEST/LUNG: Equal lung expansion bilaterally. R CT in place  HEART: Regular rate and rhythm  EXTREMITIES:  2+ Peripheral Pulses      T(C): 36.8 (03-24-21 @ 11:00), Max: 36.8 (03-23-21 @ 17:29)  HR: 69 (03-24-21 @ 11:00) (69 - 87)  BP: 150/90 (03-24-21 @ 11:00) (134/83 - 179/107)  RR: 18 (03-24-21 @ 11:00) (13 - 19)  SpO2: 96% (03-24-21 @ 11:00) (95% - 98%)      03-23-21 @ 07:01  -  03-24-21 @ 07:00  --------------------------------------------------------  IN: 300 mL / OUT: 2485 mL / NET: -2185 mL    03-24-21 @ 07:01  -  03-24-21 @ 13:39  --------------------------------------------------------  IN: 480 mL / OUT: 500 mL / NET: -20 mL        acetaminophen   Tablet .. 650 milliGRAM(s) Oral every 6 hours  ALBUTerol   0.042% 1.25 milliGRAM(s) Nebulizer every 6 hours  enoxaparin Injectable 40 milliGRAM(s) SubCutaneous daily  gabapentin 300 milliGRAM(s) Oral three times a day  HYDROmorphone  Injectable 0.5 milliGRAM(s) IV Push every 3 hours PRN  ibuprofen  Tablet. 600 milliGRAM(s) Oral every 6 hours  influenza   Vaccine 0.5 milliLiter(s) IntraMuscular once  lactated ringers. 1000 milliLiter(s) IV Continuous <Continuous>  lidocaine   Patch 1 Patch Transdermal every 24 hours  oxyCODONE    IR 5 milliGRAM(s) Oral every 4 hours PRN  oxyCODONE    IR 10 milliGRAM(s) Oral every 4 hours PRN  polyethylene glycol 3350 17 Gram(s) Oral daily  senna 2 Tablet(s) Oral at bedtime                          9.6    6.41  )-----------( 264      ( 24 Mar 2021 07:29 )             30.7     03-24    138  |  101  |  13.0  ----------------------------<  113<H>  4.0   |  29.0  |  0.65    Ca    8.4<L>      24 Mar 2021 07:29  Phos  3.2     03-24  Mg     2.2     03-24            Pain Service   [phone]    < from: CT Chest w/ IV Cont (03.20.21 @ 06:46) >  Bones: 2 part fractures of the right fourth rib, including a posterior fracture fragment that projects into the pleura and mildly displaced fracture anteriorly. 2 part fractures of the posterior and lateral fifth rib with small fragments that project into the pleural space. 2 part fractures of the posterior and lateral sixth rib with bone fragments that project into the pleura. 2 part fracture of the seventh posterior and lateral seventh rib with complete anterior displacement of the lateral fragment. There are 2 severely comminuted fractures of the right posterior eighth rib including a 2 cm bone fragment that projects into the right hemithorax. Three-part comminuted fracture of the right ninth rib with some bone fragments that mildly projected into the pleural space. Old fracture of the left lateral fourth fifth, and sixth ribs. Postsurgical changes in the right proximal humerus including a lateral sideplate and screws throughout the right humeral shaft.    < end of copied text >

## 2021-03-24 NOTE — PHYSICAL THERAPY INITIAL EVALUATION ADULT - PERTINENT HX OF CURRENT PROBLEM, REHAB EVAL
pt presents to [facility] due to s/p fall down stairs, pneumothorax, numerous right rib fractures, s/p ORIF rib plating 3/23/21,

## 2021-03-25 ENCOUNTER — TRANSCRIPTION ENCOUNTER (OUTPATIENT)
Age: 56
End: 2021-03-25

## 2021-03-25 LAB — DRUG SCREEN, SERUM: ABNORMAL

## 2021-03-25 PROCEDURE — 99024 POSTOP FOLLOW-UP VISIT: CPT

## 2021-03-25 PROCEDURE — 71045 X-RAY EXAM CHEST 1 VIEW: CPT | Mod: 26

## 2021-03-25 RX ORDER — ALBUTEROL 90 UG/1
1.25 AEROSOL, METERED ORAL EVERY 6 HOURS
Refills: 0 | Status: DISCONTINUED | OUTPATIENT
Start: 2021-03-25 | End: 2021-03-28

## 2021-03-25 RX ADMIN — ENOXAPARIN SODIUM 40 MILLIGRAM(S): 100 INJECTION SUBCUTANEOUS at 13:59

## 2021-03-25 RX ADMIN — Medication 650 MILLIGRAM(S): at 06:29

## 2021-03-25 RX ADMIN — GABAPENTIN 300 MILLIGRAM(S): 400 CAPSULE ORAL at 13:59

## 2021-03-25 RX ADMIN — GABAPENTIN 300 MILLIGRAM(S): 400 CAPSULE ORAL at 05:29

## 2021-03-25 RX ADMIN — Medication 650 MILLIGRAM(S): at 13:59

## 2021-03-25 RX ADMIN — Medication 600 MILLIGRAM(S): at 06:29

## 2021-03-25 RX ADMIN — Medication 600 MILLIGRAM(S): at 14:50

## 2021-03-25 RX ADMIN — OXYCODONE HYDROCHLORIDE 10 MILLIGRAM(S): 5 TABLET ORAL at 14:50

## 2021-03-25 RX ADMIN — LIDOCAINE 1 PATCH: 4 CREAM TOPICAL at 05:41

## 2021-03-25 RX ADMIN — Medication 600 MILLIGRAM(S): at 13:59

## 2021-03-25 RX ADMIN — Medication 600 MILLIGRAM(S): at 17:53

## 2021-03-25 RX ADMIN — POLYETHYLENE GLYCOL 3350 17 GRAM(S): 17 POWDER, FOR SOLUTION ORAL at 13:59

## 2021-03-25 RX ADMIN — GABAPENTIN 300 MILLIGRAM(S): 400 CAPSULE ORAL at 23:04

## 2021-03-25 RX ADMIN — OXYCODONE HYDROCHLORIDE 10 MILLIGRAM(S): 5 TABLET ORAL at 05:09

## 2021-03-25 RX ADMIN — SENNA PLUS 2 TABLET(S): 8.6 TABLET ORAL at 23:04

## 2021-03-25 RX ADMIN — Medication 650 MILLIGRAM(S): at 23:04

## 2021-03-25 RX ADMIN — Medication 600 MILLIGRAM(S): at 17:20

## 2021-03-25 RX ADMIN — LIDOCAINE 1 PATCH: 4 CREAM TOPICAL at 17:20

## 2021-03-25 RX ADMIN — Medication 600 MILLIGRAM(S): at 05:29

## 2021-03-25 RX ADMIN — Medication 650 MILLIGRAM(S): at 00:13

## 2021-03-25 RX ADMIN — Medication 600 MILLIGRAM(S): at 23:03

## 2021-03-25 RX ADMIN — OXYCODONE HYDROCHLORIDE 10 MILLIGRAM(S): 5 TABLET ORAL at 23:03

## 2021-03-25 RX ADMIN — Medication 650 MILLIGRAM(S): at 14:50

## 2021-03-25 RX ADMIN — OXYCODONE HYDROCHLORIDE 10 MILLIGRAM(S): 5 TABLET ORAL at 04:09

## 2021-03-25 RX ADMIN — Medication 650 MILLIGRAM(S): at 05:29

## 2021-03-25 RX ADMIN — LIDOCAINE 1 PATCH: 4 CREAM TOPICAL at 19:34

## 2021-03-25 RX ADMIN — Medication 650 MILLIGRAM(S): at 17:20

## 2021-03-25 RX ADMIN — Medication 650 MILLIGRAM(S): at 17:53

## 2021-03-25 RX ADMIN — OXYCODONE HYDROCHLORIDE 10 MILLIGRAM(S): 5 TABLET ORAL at 13:59

## 2021-03-25 RX ADMIN — Medication 600 MILLIGRAM(S): at 00:13

## 2021-03-25 NOTE — PROGRESS NOTE ADULT - SUBJECTIVE AND OBJECTIVE BOX
Subjective/Overnight event: Evaluated at bedside found laying down in no distress AM. No acute events overnight. Patient reports pain is under control with oxycodone. Currently on regular diet, tolerating well, having bowel function. Voiding adequate volumes. Denies any fever/chills, nausea/vomiting, dizziness, SOB chest pain, constipation/diarrhea, weakness, numbness.       MEDICATIONS  (STANDING):  acetaminophen   Tablet .. 650 milliGRAM(s) Oral every 6 hours  ALBUTerol   0.042% 1.25 milliGRAM(s) Nebulizer every 6 hours  enoxaparin Injectable 40 milliGRAM(s) SubCutaneous daily  gabapentin 300 milliGRAM(s) Oral three times a day  ibuprofen  Tablet. 600 milliGRAM(s) Oral every 6 hours  influenza   Vaccine 0.5 milliLiter(s) IntraMuscular once  lidocaine   Patch 1 Patch Transdermal every 24 hours  polyethylene glycol 3350 17 Gram(s) Oral daily  senna 2 Tablet(s) Oral at bedtime    MEDICATIONS  (PRN):  HYDROmorphone  Injectable 0.5 milliGRAM(s) IV Push every 3 hours PRN breakthrough pain  oxyCODONE    IR 5 milliGRAM(s) Oral every 4 hours PRN Moderate Pain (4 - 6)  oxyCODONE    IR 10 milliGRAM(s) Oral every 4 hours PRN Severe Pain (7 - 10)      Vital Signs:  Vital Signs Last 24 Hrs  T(C): 37.1 (24 Mar 2021 22:37), Max: 37.1 (24 Mar 2021 17:19)  T(F): 98.7 (24 Mar 2021 22:37), Max: 98.8 (24 Mar 2021 17:19)  HR: 90 (24 Mar 2021 22:37) (69 - 90)  BP: 140/89 (24 Mar 2021 22:37) (133/83 - 179/107)  BP(mean): --  RR: 18 (25 Mar 2021 01:30) (18 - 18)  SpO2: 87% (25 Mar 2021 01:30) (87% - 97%)    CAPILLARY BLOOD GLUCOSE          I&O's Detail    23 Mar 2021 07:01  -  24 Mar 2021 07:00  --------------------------------------------------------  IN:    Lactated Ringers: 300 mL  Total IN: 300 mL    OUT:    Bulb (mL): 115 mL    Chest Tube (mL): 960 mL    Voided (mL): 1410 mL  Total OUT: 2485 mL    Total NET: -2185 mL      24 Mar 2021 07:01  -  25 Mar 2021 02:13  --------------------------------------------------------  IN:    Oral Fluid: 480 mL  Total IN: 480 mL    OUT:    Chest Tube (mL): 250 mL    Voided (mL): 900 mL  Total OUT: 1150 mL    Total NET: -670 mL    Physical Examination:  General: alert, oriented x 3 in no acute distress   Chest: right chest tube in place to water seal   CV: normal S1/S2, RRR, no gallops, murmurs or rubs   Lungs: clear to auscultation b/l, symmetric chest movement, no rales, rhonchi or wheezing   Abdomen: soft, non-tender, non-distended, +BS  EXT: sensation intact, strength 5/5, full ROM     Labs:    03-24    138  |  101  |  13.0  ----------------------------<  113<H>  4.0   |  29.0  |  0.65    Ca    8.4<L>      24 Mar 2021 07:29  Phos  3.2     03-24  Mg     2.2     03-24                              9.6    6.41  )-----------( 264      ( 24 Mar 2021 07:29 )             30.7

## 2021-03-25 NOTE — DISCHARGE NOTE PROVIDER - NSFOLLOWUPCLINICS_GEN_ALL_ED_FT
[facility] Acute Care Surgery  Acute Care Surgery  [address] [place] [id number]  Phone: [phone]  Fax:   Follow Up Time:      Statement Selected

## 2021-03-25 NOTE — DISCHARGE NOTE PROVIDER - NSDCCPCAREPLAN_GEN_ALL_CORE_FT
PRINCIPAL DISCHARGE DIAGNOSIS  Diagnosis: Pneumothorax  Assessment and Plan of Treatment: Follow up: Please call and make an appointment with the Acute Care Surgery Clinic 10-14 days after discharge. Also, please call and make an appointment with your primary care physician as per your usual schedule.   Activity: Refrain from heavy lifting. It is recommended that you DO NOT go on an airplane or do anything that involves flying at high altitudes. DO NOT smoke cigarettes, participate in high impact activities, or go scuba diving at this time - doing so would increase your risk of getting another pneumothroax.   Diet: May continue regular diet.  Medications: Please take all medications listed on your discharge paperwork as prescribed. Pain medication has been prescribed for you. Please, take it as it has been prescribed, do not drive or operate heavy machinery while taking narcotics.  You are encouraged to take over-the-counter tylenol and/or ibuprofen for pain relief when you feel your pain no longer warrants the use of narcotic pain medications, however DO NOT TAKE percocet and tylenol at the same time as they contain the same active ingredient (acetaminophen). Take only percocet OR tylenol.  Wound Care: Please, keep wound site clean and dry. You may shower, but do not bathe.  Patient is advised to RETURN TO THE EMERGENCY DEPARTMENT for any of the following - worsening pain, fever/chills, nausea/vomiting, altered mental status, chest pain, shortness of breath, or any other new / worsening symptom.      SECONDARY DISCHARGE DIAGNOSES  Diagnosis: Rib fractures  Assessment and Plan of Treatment:

## 2021-03-25 NOTE — DISCHARGE NOTE PROVIDER - NSDCMRMEDTOKEN_GEN_ALL_CORE_FT
aspirin 81 mg oral tablet, chewable: 1 tab(s) orally once a day   acetaminophen 325 mg oral tablet: 2 tab(s) orally every 6 hours  aspirin 81 mg oral tablet, chewable: 1 tab(s) orally once a day  Cozaar 25 mg oral tablet: 1 tab(s) orally once a day  gabapentin 300 mg oral capsule: 1 cap(s) orally 3 times a day MDD:3  ibuprofen 600 mg oral tablet: 1 tab(s) orally every 6 hours  oxyCODONE 5 mg oral tablet: 1 tab(s) orally every 4 hours, As Needed -Moderate Pain (4 - 6) - for severe pain MDD:4

## 2021-03-25 NOTE — DISCHARGE NOTE PROVIDER - HOSPITAL COURSE
Admission HPI: 55M w/ no significant PMHx presenting as transfer from [facility] after fall from stairs about 14ft. Patient denies hitting head or LOC. On presentation to [facility] was found to have multiple rib fractures with bilateral pneumothoraces as well as subcutaneous air at chest wall. On arrival to trauma bay, his only complaint is chest pain and tightness. Denies n/v, f/c, abdominal pain.    Hospital Course: R sided chest tube placed in trauma bay upon arrival to [facility]. Patient was admitted to the SICU for close monitoring of pulmonary status & and ordered for rib fx (PIC) protocol w/ emphasis on pulmonary toiletting & analgesia. He had rib block performed by pain medicine physician on 3/22. He was then taken the OR w/ Dr. Barton on 3/23 for rib plating and placement of new R sided chest tube. Pt tolerated both procedures well. Was downgraded from SICU to surgical floors later in the day on 3/23. Chest tube placed to water seal on 3/24. On 3/25 ..... _______________________ Admission HPI: 55M w/ no significant PMHx presenting as transfer from [place] after fall from stairs about 14ft. Patient denies hitting head or LOC. On presentation to [place] was found to have multiple rib fractures with bilateral pneumothoraces as well as subcutaneous air at chest wall. On arrival to trauma bay, his only complaint is chest pain and tightness. Denies n/v, f/c, abdominal pain.    Hospital Course: R sided chest tube placed in trauma bay upon arrival to [facility]. Patient was admitted to the SICU for close monitoring of pulmonary status & and ordered for rib fx (PIC) protocol w/ emphasis on pulmonary toiletting & analgesia. He had rib block performed by pain medicine physician on 3/22. He was then taken the OR w/ Dr. Barton on 3/23 for rib plating and placement of new R sided chest tube. Pt tolerated both procedures well. Was downgraded from SICU to surgical floors later in the day on 3/23. Chest tube placed to water seal on 3/24. Output from chest tube continued to decrease, and was subsequently removed on 3/28/2021. After chest x-ray showed no re-accumulation of fluid collection or pneumothorax, patient was subsequently discharged to home later that day.    The patient was informed to return to the ED in the event that he developed fever, worsening chest pain, shortness of breath, persistent vomiting, bleeding from his wounds not controlled by a few minutes of direct pressure, pain not relieved by medications, or any other symptoms he found concerning.

## 2021-03-26 PROCEDURE — 99024 POSTOP FOLLOW-UP VISIT: CPT

## 2021-03-26 RX ORDER — LOSARTAN POTASSIUM 100 MG/1
25 TABLET, FILM COATED ORAL DAILY
Refills: 0 | Status: DISCONTINUED | OUTPATIENT
Start: 2021-03-26 | End: 2021-03-28

## 2021-03-26 RX ADMIN — Medication 650 MILLIGRAM(S): at 06:09

## 2021-03-26 RX ADMIN — Medication 650 MILLIGRAM(S): at 19:07

## 2021-03-26 RX ADMIN — ENOXAPARIN SODIUM 40 MILLIGRAM(S): 100 INJECTION SUBCUTANEOUS at 11:32

## 2021-03-26 RX ADMIN — GABAPENTIN 300 MILLIGRAM(S): 400 CAPSULE ORAL at 21:57

## 2021-03-26 RX ADMIN — Medication 650 MILLIGRAM(S): at 00:00

## 2021-03-26 RX ADMIN — HYDROMORPHONE HYDROCHLORIDE 0.5 MILLIGRAM(S): 2 INJECTION INTRAMUSCULAR; INTRAVENOUS; SUBCUTANEOUS at 06:09

## 2021-03-26 RX ADMIN — OXYCODONE HYDROCHLORIDE 10 MILLIGRAM(S): 5 TABLET ORAL at 11:31

## 2021-03-26 RX ADMIN — Medication 650 MILLIGRAM(S): at 23:53

## 2021-03-26 RX ADMIN — Medication 600 MILLIGRAM(S): at 23:54

## 2021-03-26 RX ADMIN — POLYETHYLENE GLYCOL 3350 17 GRAM(S): 17 POWDER, FOR SOLUTION ORAL at 11:32

## 2021-03-26 RX ADMIN — Medication 650 MILLIGRAM(S): at 18:14

## 2021-03-26 RX ADMIN — Medication 650 MILLIGRAM(S): at 07:00

## 2021-03-26 RX ADMIN — Medication 600 MILLIGRAM(S): at 07:00

## 2021-03-26 RX ADMIN — OXYCODONE HYDROCHLORIDE 10 MILLIGRAM(S): 5 TABLET ORAL at 19:47

## 2021-03-26 RX ADMIN — Medication 600 MILLIGRAM(S): at 18:14

## 2021-03-26 RX ADMIN — OXYCODONE HYDROCHLORIDE 10 MILLIGRAM(S): 5 TABLET ORAL at 03:56

## 2021-03-26 RX ADMIN — SENNA PLUS 2 TABLET(S): 8.6 TABLET ORAL at 21:58

## 2021-03-26 RX ADMIN — GABAPENTIN 300 MILLIGRAM(S): 400 CAPSULE ORAL at 06:09

## 2021-03-26 RX ADMIN — Medication 600 MILLIGRAM(S): at 12:30

## 2021-03-26 RX ADMIN — HYDROMORPHONE HYDROCHLORIDE 0.5 MILLIGRAM(S): 2 INJECTION INTRAMUSCULAR; INTRAVENOUS; SUBCUTANEOUS at 06:20

## 2021-03-26 RX ADMIN — Medication 600 MILLIGRAM(S): at 00:00

## 2021-03-26 RX ADMIN — OXYCODONE HYDROCHLORIDE 10 MILLIGRAM(S): 5 TABLET ORAL at 00:00

## 2021-03-26 RX ADMIN — LOSARTAN POTASSIUM 25 MILLIGRAM(S): 100 TABLET, FILM COATED ORAL at 11:32

## 2021-03-26 RX ADMIN — Medication 600 MILLIGRAM(S): at 06:09

## 2021-03-26 RX ADMIN — Medication 600 MILLIGRAM(S): at 11:31

## 2021-03-26 RX ADMIN — Medication 650 MILLIGRAM(S): at 12:30

## 2021-03-26 RX ADMIN — OXYCODONE HYDROCHLORIDE 10 MILLIGRAM(S): 5 TABLET ORAL at 04:50

## 2021-03-26 RX ADMIN — OXYCODONE HYDROCHLORIDE 10 MILLIGRAM(S): 5 TABLET ORAL at 12:30

## 2021-03-26 RX ADMIN — Medication 650 MILLIGRAM(S): at 11:31

## 2021-03-26 RX ADMIN — LIDOCAINE 1 PATCH: 4 CREAM TOPICAL at 05:30

## 2021-03-26 RX ADMIN — GABAPENTIN 300 MILLIGRAM(S): 400 CAPSULE ORAL at 15:32

## 2021-03-26 RX ADMIN — Medication 600 MILLIGRAM(S): at 19:07

## 2021-03-26 NOTE — PROGRESS NOTE ADULT - SUBJECTIVE AND OBJECTIVE BOX
INTERVAL HPI/OVERNIGHT EVENTS:  Patient was seen and examined at bedside this AM.  No acute events overnigt Pain controlled, PIC9, tolerating diet, voiding/ambulating with bowel function. CT remains on WS due to serosanguions output, CXR wnl, incisions well healed c/d/i      MEDICATIONS  (STANDING):  acetaminophen   Tablet .. 650 milliGRAM(s) Oral every 6 hours  enoxaparin Injectable 40 milliGRAM(s) SubCutaneous daily  gabapentin 300 milliGRAM(s) Oral three times a day  ibuprofen  Tablet. 600 milliGRAM(s) Oral every 6 hours  influenza   Vaccine 0.5 milliLiter(s) IntraMuscular once  lidocaine   Patch 1 Patch Transdermal every 24 hours  polyethylene glycol 3350 17 Gram(s) Oral daily  senna 2 Tablet(s) Oral at bedtime    MEDICATIONS  (PRN):  ALBUTerol   0.042% 1.25 milliGRAM(s) Nebulizer every 6 hours PRN Shortness of Breath and/or Wheezing  HYDROmorphone  Injectable 0.5 milliGRAM(s) IV Push every 3 hours PRN breakthrough pain  oxyCODONE    IR 5 milliGRAM(s) Oral every 4 hours PRN Moderate Pain (4 - 6)  oxyCODONE    IR 10 milliGRAM(s) Oral every 4 hours PRN Severe Pain (7 - 10)      Vital Signs Last 24 Hrs  T(C): 37 (25 Mar 2021 21:47), Max: 37 (25 Mar 2021 21:47)  T(F): 98.6 (25 Mar 2021 21:47), Max: 98.6 (25 Mar 2021 21:47)  HR: 78 (25 Mar 2021 21:47) (72 - 94)  BP: 157/89 (25 Mar 2021 21:47) (138/85 - 178/98)  BP(mean): --  RR: 18 (25 Mar 2021 21:47) (18 - 18)  SpO2: 95% (25 Mar 2021 21:47) (87% - 98%)    Physical Exam:  Gen: NAD  Neurological:  No sensory/motor deficits  HEENT: PERRLA, EOMI  Respiratory: Breath Sounds equal & CTA bilaterally, no accessory muscle use  Cardiovascular: Regular rate & rhythm, normal S1, S2; no murmurs, gallops or rubs  Gastrointestinal: Soft, non-tender, nondistended  Vascular: Equal and normal pulses: 2+ peripheral pulses throughout  Musculoskeletal: No joint pain, swelling or deformity; no limitation of movement  Skin: R chest tube site on WS, c/d/i, no air leak, incision c/d/i      I&O's Detail    24 Mar 2021 07:01  -  25 Mar 2021 07:00  --------------------------------------------------------  IN:    Oral Fluid: 840 mL  Total IN: 840 mL    OUT:    Chest Tube (mL): 490 mL    Voided (mL): 1500 mL  Total OUT: 1990 mL    Total NET: -1150 mL      25 Mar 2021 07:01  -  26 Mar 2021 00:54  --------------------------------------------------------  IN:  Total IN: 0 mL    OUT:    Chest Tube (mL): 40 mL  Total OUT: 40 mL    Total NET: -40 mL          LABS:                        9.6    6.41  )-----------( 264      ( 24 Mar 2021 07:29 )             30.7     03-24    138  |  101  |  13.0  ----------------------------<  113<H>  4.0   |  29.0  |  0.65    Ca    8.4<L>      24 Mar 2021 07:29  Phos  3.2     03-24  Mg     2.2     03-24            RADIOLOGY & ADDITIONAL STUDIES:

## 2021-03-27 DIAGNOSIS — S22.41XA MULTIPLE FRACTURES OF RIBS, RIGHT SIDE, INITIAL ENCOUNTER FOR CLOSED FRACTURE: ICD-10-CM

## 2021-03-27 PROCEDURE — 99024 POSTOP FOLLOW-UP VISIT: CPT | Mod: GC

## 2021-03-27 RX ADMIN — Medication 600 MILLIGRAM(S): at 17:53

## 2021-03-27 RX ADMIN — Medication 650 MILLIGRAM(S): at 06:30

## 2021-03-27 RX ADMIN — GABAPENTIN 300 MILLIGRAM(S): 400 CAPSULE ORAL at 21:50

## 2021-03-27 RX ADMIN — Medication 650 MILLIGRAM(S): at 05:44

## 2021-03-27 RX ADMIN — OXYCODONE HYDROCHLORIDE 10 MILLIGRAM(S): 5 TABLET ORAL at 21:50

## 2021-03-27 RX ADMIN — POLYETHYLENE GLYCOL 3350 17 GRAM(S): 17 POWDER, FOR SOLUTION ORAL at 12:43

## 2021-03-27 RX ADMIN — Medication 600 MILLIGRAM(S): at 05:44

## 2021-03-27 RX ADMIN — Medication 600 MILLIGRAM(S): at 00:00

## 2021-03-27 RX ADMIN — Medication 600 MILLIGRAM(S): at 06:30

## 2021-03-27 RX ADMIN — OXYCODONE HYDROCHLORIDE 10 MILLIGRAM(S): 5 TABLET ORAL at 04:24

## 2021-03-27 RX ADMIN — GABAPENTIN 300 MILLIGRAM(S): 400 CAPSULE ORAL at 14:41

## 2021-03-27 RX ADMIN — OXYCODONE HYDROCHLORIDE 10 MILLIGRAM(S): 5 TABLET ORAL at 15:54

## 2021-03-27 RX ADMIN — OXYCODONE HYDROCHLORIDE 10 MILLIGRAM(S): 5 TABLET ORAL at 22:50

## 2021-03-27 RX ADMIN — Medication 600 MILLIGRAM(S): at 13:40

## 2021-03-27 RX ADMIN — Medication 650 MILLIGRAM(S): at 13:40

## 2021-03-27 RX ADMIN — HYDROMORPHONE HYDROCHLORIDE 0.5 MILLIGRAM(S): 2 INJECTION INTRAMUSCULAR; INTRAVENOUS; SUBCUTANEOUS at 17:53

## 2021-03-27 RX ADMIN — OXYCODONE HYDROCHLORIDE 10 MILLIGRAM(S): 5 TABLET ORAL at 16:40

## 2021-03-27 RX ADMIN — OXYCODONE HYDROCHLORIDE 10 MILLIGRAM(S): 5 TABLET ORAL at 09:53

## 2021-03-27 RX ADMIN — ENOXAPARIN SODIUM 40 MILLIGRAM(S): 100 INJECTION SUBCUTANEOUS at 12:43

## 2021-03-27 RX ADMIN — OXYCODONE HYDROCHLORIDE 10 MILLIGRAM(S): 5 TABLET ORAL at 10:20

## 2021-03-27 RX ADMIN — Medication 650 MILLIGRAM(S): at 23:08

## 2021-03-27 RX ADMIN — OXYCODONE HYDROCHLORIDE 10 MILLIGRAM(S): 5 TABLET ORAL at 03:24

## 2021-03-27 RX ADMIN — Medication 600 MILLIGRAM(S): at 23:08

## 2021-03-27 RX ADMIN — GABAPENTIN 300 MILLIGRAM(S): 400 CAPSULE ORAL at 05:44

## 2021-03-27 RX ADMIN — SENNA PLUS 2 TABLET(S): 8.6 TABLET ORAL at 21:50

## 2021-03-27 RX ADMIN — Medication 650 MILLIGRAM(S): at 12:43

## 2021-03-27 RX ADMIN — Medication 600 MILLIGRAM(S): at 18:50

## 2021-03-27 RX ADMIN — Medication 650 MILLIGRAM(S): at 17:53

## 2021-03-27 RX ADMIN — LOSARTAN POTASSIUM 25 MILLIGRAM(S): 100 TABLET, FILM COATED ORAL at 05:44

## 2021-03-27 RX ADMIN — Medication 650 MILLIGRAM(S): at 00:00

## 2021-03-27 RX ADMIN — Medication 600 MILLIGRAM(S): at 12:43

## 2021-03-27 RX ADMIN — Medication 650 MILLIGRAM(S): at 18:50

## 2021-03-27 RX ADMIN — HYDROMORPHONE HYDROCHLORIDE 0.5 MILLIGRAM(S): 2 INJECTION INTRAMUSCULAR; INTRAVENOUS; SUBCUTANEOUS at 18:08

## 2021-03-27 NOTE — PROGRESS NOTE ADULT - SUBJECTIVE AND OBJECTIVE BOX
SUBJECTIVE/24 hour events:  No new issues or overnight events. Patient is OOB, eating, voiding, having BMs. Using IS, giving strong cough. Denies pain, difficulty breathing, cough, fever.      Vital Signs Last 24 Hrs  T(C): 36.9 (26 Mar 2021 22:05), Max: 37.3 (26 Mar 2021 11:23)  T(F): 98.4 (26 Mar 2021 22:05), Max: 99.1 (26 Mar 2021 11:23)  HR: 99 (26 Mar 2021 22:05) (78 - 99)  BP: 127/86 (26 Mar 2021 22:05) (127/86 - 172/102)  BP(mean): --  RR: 18 (26 Mar 2021 22:05) (18 - 18)  SpO2: 93% (26 Mar 2021 22:05) (92% - 95%)  Drug Dosing Weight  Height (cm): 195.6 (20 Mar 2021 12:47)  Weight (kg): 97 (20 Mar 2021 12:47)  BMI (kg/m2): 25.4 (20 Mar 2021 12:47)  BSA (m2): 2.3 (20 Mar 2021 12:47)  I&O's Detail    25 Mar 2021 07:01  -  26 Mar 2021 07:00  --------------------------------------------------------  IN:  Total IN: 0 mL    OUT:    Chest Tube (mL): 250 mL    Voided (mL): 2100 mL  Total OUT: 2350 mL    Total NET: -2350 mL      26 Mar 2021 07:01  -  27 Mar 2021 00:44  --------------------------------------------------------  IN:  Total IN: 0 mL    OUT:    Chest Tube (mL): 0 mL    Voided (mL): 450 mL  Total OUT: 450 mL    Total NET: -450 mL        Allergies    No Known Allergies    Intolerances    PHYSICAL EXAM:  Constitutional: Awake, alert, in no acute distress  Eyes: PERRL  HENT: no scalp tenderness or deformity, no facial tenderness, airway patent  Neck: no cervical spine tenderness, no palpable stepoff, no tracheal deviation  CV: Chest tube in place on right side. RRR, no murmur, 2+ distal pulses in all extremities  Pulm: non-labored respirations, breath equal bilaterally  Abdomen: soft, non-tender, non-distended, no ecchymosis  Back: no spinal tenderness, no palpable stepoff  Extremities: stable pelvis, no extremity tenderness or deformity  Skin: no rash  Neuro: AAOx3, GCS 15, moving all extremities equally, no focal neurologic deficit      MEDICATIONS  (STANDING):  acetaminophen   Tablet .. 650 milliGRAM(s) Oral every 6 hours  enoxaparin Injectable 40 milliGRAM(s) SubCutaneous daily  gabapentin 300 milliGRAM(s) Oral three times a day  ibuprofen  Tablet. 600 milliGRAM(s) Oral every 6 hours  influenza   Vaccine 0.5 milliLiter(s) IntraMuscular once  lidocaine   Patch 1 Patch Transdermal every 24 hours  losartan 25 milliGRAM(s) Oral daily  polyethylene glycol 3350 17 Gram(s) Oral daily  senna 2 Tablet(s) Oral at bedtime    MEDICATIONS  (PRN):  ALBUTerol   0.042% 1.25 milliGRAM(s) Nebulizer every 6 hours PRN Shortness of Breath and/or Wheezing  HYDROmorphone  Injectable 0.5 milliGRAM(s) IV Push every 3 hours PRN breakthrough pain  oxyCODONE    IR 5 milliGRAM(s) Oral every 4 hours PRN Moderate Pain (4 - 6)  oxyCODONE    IR 10 milliGRAM(s) Oral every 4 hours PRN Severe Pain (7 - 10)

## 2021-03-28 ENCOUNTER — TRANSCRIPTION ENCOUNTER (OUTPATIENT)
Age: 56
End: 2021-03-28

## 2021-03-28 VITALS
TEMPERATURE: 98 F | DIASTOLIC BLOOD PRESSURE: 86 MMHG | RESPIRATION RATE: 18 BRPM | OXYGEN SATURATION: 96 % | SYSTOLIC BLOOD PRESSURE: 137 MMHG | HEART RATE: 86 BPM

## 2021-03-28 PROCEDURE — 86901 BLOOD TYPING SEROLOGIC RH(D): CPT

## 2021-03-28 PROCEDURE — 82570 ASSAY OF URINE CREATININE: CPT

## 2021-03-28 PROCEDURE — 86900 BLOOD TYPING SEROLOGIC ABO: CPT

## 2021-03-28 PROCEDURE — C1889: CPT

## 2021-03-28 PROCEDURE — 86769 SARS-COV-2 COVID-19 ANTIBODY: CPT

## 2021-03-28 PROCEDURE — 84484 ASSAY OF TROPONIN QUANT: CPT

## 2021-03-28 PROCEDURE — 82435 ASSAY OF BLOOD CHLORIDE: CPT

## 2021-03-28 PROCEDURE — 82330 ASSAY OF CALCIUM: CPT

## 2021-03-28 PROCEDURE — C9399: CPT

## 2021-03-28 PROCEDURE — 85610 PROTHROMBIN TIME: CPT

## 2021-03-28 PROCEDURE — 84300 ASSAY OF URINE SODIUM: CPT

## 2021-03-28 PROCEDURE — U0005: CPT

## 2021-03-28 PROCEDURE — U0003: CPT

## 2021-03-28 PROCEDURE — 83605 ASSAY OF LACTIC ACID: CPT

## 2021-03-28 PROCEDURE — 97116 GAIT TRAINING THERAPY: CPT

## 2021-03-28 PROCEDURE — 82436 ASSAY OF URINE CHLORIDE: CPT

## 2021-03-28 PROCEDURE — 93005 ELECTROCARDIOGRAM TRACING: CPT

## 2021-03-28 PROCEDURE — 71045 X-RAY EXAM CHEST 1 VIEW: CPT

## 2021-03-28 PROCEDURE — C1713: CPT

## 2021-03-28 PROCEDURE — 84100 ASSAY OF PHOSPHORUS: CPT

## 2021-03-28 PROCEDURE — 86803 HEPATITIS C AB TEST: CPT

## 2021-03-28 PROCEDURE — 80048 BASIC METABOLIC PNL TOTAL CA: CPT

## 2021-03-28 PROCEDURE — 85025 COMPLETE CBC W/AUTO DIFF WBC: CPT

## 2021-03-28 PROCEDURE — 86850 RBC ANTIBODY SCREEN: CPT

## 2021-03-28 PROCEDURE — 97163 PT EVAL HIGH COMPLEX 45 MIN: CPT

## 2021-03-28 PROCEDURE — 72170 X-RAY EXAM OF PELVIS: CPT

## 2021-03-28 PROCEDURE — 83935 ASSAY OF URINE OSMOLALITY: CPT

## 2021-03-28 PROCEDURE — 84295 ASSAY OF SERUM SODIUM: CPT

## 2021-03-28 PROCEDURE — 36415 COLL VENOUS BLD VENIPUNCTURE: CPT

## 2021-03-28 PROCEDURE — 83930 ASSAY OF BLOOD OSMOLALITY: CPT

## 2021-03-28 PROCEDURE — 94640 AIRWAY INHALATION TREATMENT: CPT

## 2021-03-28 PROCEDURE — 84132 ASSAY OF SERUM POTASSIUM: CPT

## 2021-03-28 PROCEDURE — 71045 X-RAY EXAM CHEST 1 VIEW: CPT | Mod: 26

## 2021-03-28 PROCEDURE — 80307 DRUG TEST PRSMV CHEM ANLYZR: CPT

## 2021-03-28 PROCEDURE — 80053 COMPREHEN METABOLIC PANEL: CPT

## 2021-03-28 PROCEDURE — 99024 POSTOP FOLLOW-UP VISIT: CPT | Mod: GC

## 2021-03-28 PROCEDURE — 85018 HEMOGLOBIN: CPT

## 2021-03-28 PROCEDURE — 85014 HEMATOCRIT: CPT

## 2021-03-28 PROCEDURE — 82947 ASSAY GLUCOSE BLOOD QUANT: CPT

## 2021-03-28 PROCEDURE — 83690 ASSAY OF LIPASE: CPT

## 2021-03-28 PROCEDURE — 85730 THROMBOPLASTIN TIME PARTIAL: CPT

## 2021-03-28 PROCEDURE — 82803 BLOOD GASES ANY COMBINATION: CPT

## 2021-03-28 PROCEDURE — 83735 ASSAY OF MAGNESIUM: CPT

## 2021-03-28 RX ORDER — ACETAMINOPHEN 500 MG
2 TABLET ORAL
Qty: 0 | Refills: 0 | DISCHARGE
Start: 2021-03-28

## 2021-03-28 RX ORDER — LOSARTAN POTASSIUM 100 MG/1
1 TABLET, FILM COATED ORAL
Qty: 30 | Refills: 0
Start: 2021-03-28 | End: 2021-04-26

## 2021-03-28 RX ORDER — OXYCODONE HYDROCHLORIDE 5 MG/1
1 TABLET ORAL
Qty: 30 | Refills: 0
Start: 2021-03-28 | End: 2021-04-01

## 2021-03-28 RX ORDER — IBUPROFEN 200 MG
1 TABLET ORAL
Qty: 0 | Refills: 0 | DISCHARGE
Start: 2021-03-28

## 2021-03-28 RX ORDER — GABAPENTIN 400 MG/1
1 CAPSULE ORAL
Qty: 21 | Refills: 0
Start: 2021-03-28 | End: 2021-04-03

## 2021-03-28 RX ADMIN — Medication 650 MILLIGRAM(S): at 17:03

## 2021-03-28 RX ADMIN — OXYCODONE HYDROCHLORIDE 10 MILLIGRAM(S): 5 TABLET ORAL at 06:10

## 2021-03-28 RX ADMIN — GABAPENTIN 300 MILLIGRAM(S): 400 CAPSULE ORAL at 14:01

## 2021-03-28 RX ADMIN — ENOXAPARIN SODIUM 40 MILLIGRAM(S): 100 INJECTION SUBCUTANEOUS at 11:55

## 2021-03-28 RX ADMIN — Medication 650 MILLIGRAM(S): at 05:19

## 2021-03-28 RX ADMIN — POLYETHYLENE GLYCOL 3350 17 GRAM(S): 17 POWDER, FOR SOLUTION ORAL at 11:54

## 2021-03-28 RX ADMIN — Medication 650 MILLIGRAM(S): at 00:08

## 2021-03-28 RX ADMIN — OXYCODONE HYDROCHLORIDE 10 MILLIGRAM(S): 5 TABLET ORAL at 11:53

## 2021-03-28 RX ADMIN — Medication 600 MILLIGRAM(S): at 05:19

## 2021-03-28 RX ADMIN — Medication 600 MILLIGRAM(S): at 00:08

## 2021-03-28 RX ADMIN — Medication 600 MILLIGRAM(S): at 11:54

## 2021-03-28 RX ADMIN — Medication 600 MILLIGRAM(S): at 17:03

## 2021-03-28 RX ADMIN — OXYCODONE HYDROCHLORIDE 10 MILLIGRAM(S): 5 TABLET ORAL at 05:19

## 2021-03-28 RX ADMIN — Medication 600 MILLIGRAM(S): at 12:50

## 2021-03-28 RX ADMIN — GABAPENTIN 300 MILLIGRAM(S): 400 CAPSULE ORAL at 05:19

## 2021-03-28 RX ADMIN — OXYCODONE HYDROCHLORIDE 10 MILLIGRAM(S): 5 TABLET ORAL at 12:50

## 2021-03-28 RX ADMIN — OXYCODONE HYDROCHLORIDE 10 MILLIGRAM(S): 5 TABLET ORAL at 17:02

## 2021-03-28 RX ADMIN — Medication 650 MILLIGRAM(S): at 12:50

## 2021-03-28 RX ADMIN — Medication 650 MILLIGRAM(S): at 06:10

## 2021-03-28 RX ADMIN — LOSARTAN POTASSIUM 25 MILLIGRAM(S): 100 TABLET, FILM COATED ORAL at 05:19

## 2021-03-28 RX ADMIN — Medication 650 MILLIGRAM(S): at 11:54

## 2021-03-28 RX ADMIN — Medication 600 MILLIGRAM(S): at 06:10

## 2021-03-28 NOTE — PROGRESS NOTE ADULT - PROVIDER SPECIALTY LIST ADULT
Pain Medicine
SICU
Surgery
SICU
SICU
Trauma Surgery
Pain Medicine
Trauma Surgery
Trauma Surgery
Thoracic Surgery

## 2021-03-28 NOTE — PROGRESS NOTE ADULT - PROBLEM SELECTOR PROBLEM 1
Rib pain on right side
Pneumothorax
Rib pain on right side
Rib pain on right side
Closed fracture of multiple ribs of right side, initial encounter
Closed fracture of multiple ribs of right side, initial encounter

## 2021-03-28 NOTE — PROGRESS NOTE ADULT - PROBLEM SELECTOR PLAN 1
pic protocol  pain control  incentive spirometer  pulmonary toliet  oob as tolerated
Maintain R chest tube suction
continue with pain control  continue with chest tube to water seal until output lower  CXR pending
right chest tube removed  post pull CXR  if no PTX on CXR then d/c home

## 2021-03-28 NOTE — PROGRESS NOTE ADULT - ASSESSMENT
55M POD3 ORIF of R 6-9th rib fx recoving appropriately  AM CXR  Remove CT if output decreasing and CXR clear  Post pull CXR and dc home if no abnormalities.   pain control, DVT PPx
55M presenting for 6-9 rib fractures, bilateral PTX, with subcutaneous emphysema. POD 4 o ORIF fxs. Patient stable with no respiratory distress or signs of infection. Yesterday no drainage from tube in 12 hours.  -Evaluate drain output. Potential removal of tube.   -Pain control  -Potential dispo home
55M w/ no significant PMHx presenting as transfer from [place] after fall from stairs about 14ft.   acute R 4th-9th rib fx    s/p R RUFINA block 3/23      Pain controlled  Pain service will sign off  Please reconsult as needed     Recommendations:    Cont  acetaminophen   Tablet .. 650 milliGRAM(s) Oral every 6 hours  HYDROmorphone  Injectable 0.5 milliGRAM(s) IV Push every 3 hours PRN BT pain  ibuprofen  Tablet. 600 milliGRAM(s) Oral every 6 hours with food. HOLD for black/red stool  lidocaine   Patch 12 hours on, 12 hours off. Max 3 patches on at one time   oxyCODONE    IR 5 milliGRAM(s) Oral every 4 hours PRN  oxyCODONE    IR 10 milliGRAM(s) Oral every 4 hours PRN    If pain becomes uncontrolled,  Start flexeril 10mg TID standing. HOLD for sedation 
55yMale presenting with: R 4-9 rib frx, BL PTX, R Hemothorax, Extensive SubQ air.  S/p placement of Chest tube, right hemithorax, however with concern for retained hemothorax.      Neurological: s/p paravertebral rib block, CW Tylenol ATC, Lidoderm , Motrin PRN, Dilaudid IVP PRN. PIC protocol    Cards: NSR, IVF 75cc/hr     Pulmonary: FU AM CXR.  Maintain Chest tube today on suction, Encourage IS     Gastrointestinal: Reg diet, NPO since midnight for planned procedure     Genitourinary: Voids    Heme: Lovenox / SCD    ID: None    Dispo: OR today for evacuation of retained hemothorax and rib plating   
54 y/o M presents to the ED after falling off from a 14 feet ladder sustaining several injuries including right 6th to 9th rib fracture, b/l pneumothorax, right hemothorax and extensive subcutaneous emphysema.    Plan:   - Follow AM CXR for re-evaluation of pneumothorax and hemothorax  - If it improves may consider removing the chest tube   - Continue DVT ppx  
55M w/ no significant PMHx presenting as transfer from [place] after fall from stairs about 14ft.   acute R 4th-9th rib fx    s/p R RUFINA block 3/23    Recommendations:    Cont  acetaminophen   Tablet .. 650 milliGRAM(s) Oral every 6 hours  HYDROmorphone  Injectable 0.5 milliGRAM(s) IV Push every 3 hours PRN BT pain  ibuprofen  Tablet. 600 milliGRAM(s) Oral every 6 hours with food. HOLD for black/red stool  lidocaine   Patch 12 hours on, 12 hours off. Max 3 patches on at one time   oxyCODONE    IR 5 milliGRAM(s) Oral every 4 hours PRN  oxyCODONE    IR 10 milliGRAM(s) Oral every 4 hours PRN    If pain becomes uncontrolled,  Start flexeril 10mg TID standing. HOLD for sedation 
56 y/o M s/p fall from 14 feet ladder sustaining right 6th-9th rib fractures, right side hemothorax and bilateral pneumothorax s/p right side chest tube placement. Patient chest tube has been on water seal for the past 2 days with no presence of leak upon testing. Chest tube still draining serosanguinous fluid with decrease output compared to previous. Daily CXR have remained with stable findings. Last  Hgb WNL. PIC score of 9. Currently on Losartan due to episodes of hypertension. No recent hypertension reported.    Plan:  - Monitor Chest Tube output. If improvement consider discontinuing chest tube    - Continue PIC Protocol   - Continue with Losartan for hypertension   Dispo: PT rec Home  
A/P   Right 4-9th rib fractures, complex  Right small hemopneumothorax  Left small pneumothorax  Moderate pneumomediastinum  Extensive subcutaneous emphysema  S/P fall down 14 steps, + LOC

## 2021-03-28 NOTE — PROGRESS NOTE ADULT - ATTENDING COMMENTS
Agree with above assessment.  The patient was seen and examined by me around 0850 this morning.  The patient states that the pain is fairly well controlled at rest and worse with coughing and movement.  The right chest tube is without air leak, output around 150 cc overnight, right chest and flank with stable ecchymosis.  Will plan to maintain chest tube today, if output diminished over the next 24 hours, then will d/c chest tube.  Continue with deep breathing and pain control.
Agree with above assessment.  The patient was seen and examined by me.  The patient is without shortness of breath.  The right chest tube output was under 80cc in 24 hours.  The right chest tube was removed, ecchymosis of the right chest wall is stable incision site is without infection.  If CXR is without pneumothorax then will plan for discharge home later today.
I have seen and examined the patieint  pain improving  Chest tube 250ml/25 hrs  Cont OOB, ambulate  CT to be removed whrn output less than 200ml in 24hrs  dvt chemorphyalaxys
I have seen and examined the patient  pain improved after ORIF  CH no air leak, CXR clear.  output from CT still elevated at over 250ml/24 hrs.  remove when lower than 200 ml/ 24 hrs.  Multimodality analgesia.  DVT chemorphyalxys
The patient was seen and examined  Events noted  No new problems    Neurologically normal  Respiratory:  SaO2 okay, CXR reviewed  Hemodynamically normal  Renal:  Urine flow okay, hyponatremia noted  GI:  PO diet  Hematologic:  Hgb okay  ID:  No active issues    Plan:  Will ask APS to see the patient  Work-up and correct hypo-Na  Plan for rib plating and evacuation of retained hemothorax tomorrow  DVT prophylaxis
Patient was seen in the SICU on 3/21/2021 during rounds and then once later.  Extensive displaced rib Fxs bilaterally Rt > Lt with extensive subcutaneous emphysema over chest and neck and pneumomediastinum, Pneumopericardium  and small pneumothorax. Rt sided chest tube in place.  Patient is stable hemodynamically and respiratory wise.  CXR 3/21/2021 looks more hazy over the Rt side  possible retained hemothorax on the Rt side. Hard to evaluate pneumothorax accurately because of the overlying Subcutaneous emphysema.  If the CXR gets worse in am will consider repeating CT chest to evaluate.  Will watch for cardiac function compromise due to mass effect by pneumopericardium. So far stable.  Pain control for rib Fxs and pulmonary toilet.  Rest supportive care.
The patient was seen and examined  Events noted  No new problems  Afebrile  Pain is well controlled  Output from drains noted  Will D/C [facility]  CXR tomorrow--likely remove CT at that time  DVT prophylaxis  Mobilize

## 2021-03-28 NOTE — PROGRESS NOTE ADULT - SUBJECTIVE AND OBJECTIVE BOX
Subjective/Overnight event: Evaluated at bedside found laying down in no distress AM. No overnight event. Patient   Denies any fever/chills, nausea/vomiting, dizziness, SOB chest pain, constipation/diarrhea, weakness, numbness.       MEDICATIONS  (STANDING):  acetaminophen   Tablet .. 650 milliGRAM(s) Oral every 6 hours  enoxaparin Injectable 40 milliGRAM(s) SubCutaneous daily  gabapentin 300 milliGRAM(s) Oral three times a day  ibuprofen  Tablet. 600 milliGRAM(s) Oral every 6 hours  influenza   Vaccine 0.5 milliLiter(s) IntraMuscular once  lidocaine   Patch 1 Patch Transdermal every 24 hours  losartan 25 milliGRAM(s) Oral daily  polyethylene glycol 3350 17 Gram(s) Oral daily  senna 2 Tablet(s) Oral at bedtime    MEDICATIONS  (PRN):  ALBUTerol   0.042% 1.25 milliGRAM(s) Nebulizer every 6 hours PRN Shortness of Breath and/or Wheezing  HYDROmorphone  Injectable 0.5 milliGRAM(s) IV Push every 3 hours PRN breakthrough pain  oxyCODONE    IR 5 milliGRAM(s) Oral every 4 hours PRN Moderate Pain (4 - 6)  oxyCODONE    IR 10 milliGRAM(s) Oral every 4 hours PRN Severe Pain (7 - 10)      Vital Signs:  Vital Signs Last 24 Hrs  T(C): 36.7 (27 Mar 2021 22:44), Max: 37.1 (27 Mar 2021 17:15)  T(F): 98.1 (27 Mar 2021 22:44), Max: 98.7 (27 Mar 2021 17:15)  HR: 82 (27 Mar 2021 22:44) (78 - 97)  BP: 154/101 (27 Mar 2021 22:44) (118/98 - 154/101)  BP(mean): --  RR: 19 (27 Mar 2021 22:44) (18 - 19)  SpO2: 94% (27 Mar 2021 23:27) (92% - 96%)    I&O's Detail    26 Mar 2021 07:01  -  27 Mar 2021 07:00  --------------------------------------------------------  IN:  Total IN: 0 mL    OUT:    Chest Tube (mL): 20 mL    Voided (mL): 1550 mL  Total OUT: 1570 mL    Total NET: -1570 mL      27 Mar 2021 07:01  -  28 Mar 2021 01:54  --------------------------------------------------------  IN:  Total IN: 0 mL    OUT:    Chest Tube (mL): 70 mL  Total OUT: 70 mL    Total NET: -70 mL    Physical Examination:  General: alert, oriented x 3 in no acute distress   Chest: right side chest tube in place to suction   CV: normal S1/S2, RRR, no gallops, murmurs or rubs   Lungs: clear to auscultation b/l, symmetric chest movement, no rales, rhonchi or wheezing   Abdomen: soft, depressible, non-tender, non-distended, +BS  EXT: RLE: thigh ecchymosis, full ROM, sensation intact, strength 5/5

## 2021-03-28 NOTE — DISCHARGE NOTE NURSING/CASE MANAGEMENT/SOCIAL WORK - PATIENT PORTAL LINK FT
You can access the FollowMyHealth Patient Portal offered by [facility] by registering at the following website: [email or web address]. By joining [employer]’s FollowMyHealth portal, you will also be able to view your health information using other applications (apps) compatible with our system.

## 2021-05-05 NOTE — ED PROVIDER NOTE - NSTIMEPROVIDERCAREINITIATE_GEN_ER
Lisinopril increased to 40 mg daily while loading on dofetilide.  BMP in one week, please assess blood pressures when calling with results.   28-Feb-2018 09:30

## 2021-10-06 PROBLEM — I10 ESSENTIAL HYPERTENSION: Status: ACTIVE | Noted: 2018-03-16

## 2021-11-18 NOTE — PATIENT PROFILE ADULT - NSPROMEDSADMININFO_GEN_A_NUR
Duration Of Freeze Thaw-Cycle (Seconds): 0
Post-Care Instructions: I reviewed with the patient in detail post-care instructions. Patient is to wear sunprotection, and avoid picking at any of the treated lesions. Pt may apply Vaseline to crusted or scabbing areas.
Show Applicator Variable?: Yes
Render Note In Bullet Format When Appropriate: No
Consent: The patient's consent was obtained including but not limited to risks of crusting, scabbing, blistering, scarring, darker or lighter pigmentary change, recurrence, incomplete removal and infection.
Detail Level: Zone
no concerns

## 2022-04-08 NOTE — CONSULT NOTE ADULT - CONSULT REQUESTED DATE/TIME
----- Message from Ana Leiva PA-C sent at 4/6/2022  9:45 AM EDT -----  Please call the patient regarding her abnormal result. See telephone encounter.   
20-Mar-2021 09:41

## 2023-03-10 NOTE — DISCHARGE NOTE ADULT - NS DC ANGIO PCI YN
General: denies fever, chills  Eyes: denies visual changes, blurred vision  CV: denies chest pain, palpitations  Resp: denies difficulty breathing, cough  Abdominal: denies nausea, vomiting, diarrhea, abdominal pain  : denies urinary pain or discharge  MSK: denies muscle aches, leg swelling  Neuro: denies headaches, numbness, tingling  Skin: denies rashes, bruises
no

## 2023-12-27 NOTE — DISCHARGE NOTE NURSING/CASE MANAGEMENT/SOCIAL WORK - NSDCPEFALRISK_GEN_ALL_CORE
JUAN CARLOS on 12/27 to reschedule apt with Dr.J HOPE   
Patient information on fall and injury prevention

## 2024-03-18 NOTE — PHYSICAL THERAPY INITIAL EVALUATION ADULT - PRECAUTIONS/LIMITATIONS, REHAB EVAL
Get your Prescription filled at [facility]!    [facility] uses the same medical record your doctor uses. More accurate and timely information for your doctor and your pharmacy means more effective and safer health care for you and your family.  [facility] accepts all of the major insurance plans which means you pay the same copay wherever you go.  [facility]ists take the time to explain your medication regimen to you.  [facility] will mail your medications to you free of postage and handling charges. We love francisca.        At [facility], one important tool we use to improve our patient services is our Patient Survey.  Following your visit you may receive our survey in the mail.    Please take the time to complete the survey.    If your visit with us was great, we want to hear about it.    If we can improve, please let us know how.            CIWA, keep head of bed above 30-45/cardiac precautions/seizure precautions

## 2025-04-12 NOTE — PATIENT PROFILE ADULT. - NSNUTRITIONRISK_GI_A_CORE
Please follow-up with your pediatrician on Monday.  Continue symptomatic care at home with Tylenol and Motrin as needed for fever and pain.  Return to the ER for any worsening symptoms or concerns   No indicators present